# Patient Record
Sex: MALE | Race: WHITE | NOT HISPANIC OR LATINO | Employment: OTHER | URBAN - METROPOLITAN AREA
[De-identification: names, ages, dates, MRNs, and addresses within clinical notes are randomized per-mention and may not be internally consistent; named-entity substitution may affect disease eponyms.]

---

## 2018-01-10 ENCOUNTER — AMBULATORY - HEALTHEAST (OUTPATIENT)
Dept: GERIATRICS | Facility: CLINIC | Age: 73
End: 2018-01-10

## 2018-01-11 ENCOUNTER — AMBULATORY - HEALTHEAST (OUTPATIENT)
Dept: ADMINISTRATIVE | Facility: CLINIC | Age: 73
End: 2018-01-11

## 2018-01-11 RX ORDER — POLYETHYLENE GLYCOL 3350 17 G/17G
17 POWDER, FOR SOLUTION ORAL DAILY PRN
Status: SHIPPED | COMMUNITY
Start: 2018-01-08

## 2018-01-11 RX ORDER — ACETAMINOPHEN 325 MG/1
650 TABLET ORAL EVERY 4 HOURS PRN
Status: SHIPPED | COMMUNITY
Start: 2018-01-08

## 2018-01-11 RX ORDER — ATORVASTATIN CALCIUM 40 MG/1
40 TABLET, FILM COATED ORAL DAILY
Status: SHIPPED | COMMUNITY
Start: 2018-01-08

## 2018-01-11 RX ORDER — FUROSEMIDE 20 MG
20 TABLET ORAL DAILY
Status: SHIPPED | COMMUNITY
Start: 2018-01-08

## 2018-01-11 RX ORDER — ISOSORBIDE MONONITRATE 30 MG/1
30 TABLET, EXTENDED RELEASE ORAL DAILY
Status: SHIPPED | COMMUNITY
Start: 2018-01-08

## 2018-01-17 ENCOUNTER — OFFICE VISIT - HEALTHEAST (OUTPATIENT)
Dept: GERIATRICS | Facility: CLINIC | Age: 73
End: 2018-01-17

## 2018-01-17 DIAGNOSIS — N18.30 ACUTE WORSENING OF STAGE 3 CHRONIC KIDNEY DISEASE (H): ICD-10-CM

## 2018-01-17 DIAGNOSIS — F89 CHILD DEVELOPMENT DEVIATION: ICD-10-CM

## 2018-01-17 DIAGNOSIS — I10 ESSENTIAL (PRIMARY) HYPERTENSION: ICD-10-CM

## 2018-01-17 DIAGNOSIS — I50.22 CHRONIC SYSTOLIC HEART FAILURE (H): ICD-10-CM

## 2018-01-17 DIAGNOSIS — M10.9 GOUT: ICD-10-CM

## 2018-01-17 RX ORDER — PREDNISONE 20 MG/1
40 TABLET ORAL DAILY
Status: SHIPPED | COMMUNITY
Start: 2018-01-09 | End: 2023-01-01

## 2018-01-18 ENCOUNTER — OFFICE VISIT - HEALTHEAST (OUTPATIENT)
Dept: GERIATRICS | Facility: CLINIC | Age: 73
End: 2018-01-18

## 2018-01-18 DIAGNOSIS — I10 ESSENTIAL HYPERTENSION: ICD-10-CM

## 2018-01-18 DIAGNOSIS — F89 DEVELOPMENTAL DISABILITY: ICD-10-CM

## 2018-01-18 DIAGNOSIS — E78.5 DYSLIPIDEMIA: ICD-10-CM

## 2018-01-18 DIAGNOSIS — I50.9 CONGESTIVE HEART FAILURE (H): ICD-10-CM

## 2018-01-18 DIAGNOSIS — M10.9 GOUT: ICD-10-CM

## 2018-01-19 ENCOUNTER — OFFICE VISIT - HEALTHEAST (OUTPATIENT)
Dept: GERIATRICS | Facility: CLINIC | Age: 73
End: 2018-01-19

## 2018-01-19 DIAGNOSIS — I10 ESSENTIAL (PRIMARY) HYPERTENSION: ICD-10-CM

## 2018-01-19 DIAGNOSIS — I42.8 NONISCHEMIC CARDIOMYOPATHY (H): ICD-10-CM

## 2018-01-19 DIAGNOSIS — M10.9 GOUT: ICD-10-CM

## 2018-01-19 DIAGNOSIS — I50.22 CHRONIC SYSTOLIC HEART FAILURE (H): ICD-10-CM

## 2018-01-22 ENCOUNTER — AMBULATORY - HEALTHEAST (OUTPATIENT)
Dept: GERIATRICS | Facility: CLINIC | Age: 73
End: 2018-01-22

## 2021-05-29 ENCOUNTER — RECORDS - HEALTHEAST (OUTPATIENT)
Dept: ADMINISTRATIVE | Facility: CLINIC | Age: 76
End: 2021-05-29

## 2021-05-31 VITALS — BODY MASS INDEX: 23.01 KG/M2 | WEIGHT: 121.8 LBS

## 2021-05-31 VITALS — BODY MASS INDEX: 23.17 KG/M2 | WEIGHT: 122.6 LBS

## 2021-06-15 NOTE — PROGRESS NOTES
[facility]    Facility:   [facility] [[id number]]   Code Status: FULL CODE      CHIEF COMPLAINT/REASON FOR VISIT:  Chief Complaint   Patient presents with     Review Of Multiple Medical Conditions       HISTORY:      HPI: Akin is a 72 y.o. male undergoing therapy at [facility]. He presented to [facility] ER on 1/6/18 after his brother was unable to reach him on the telephone. He was found on the BR floor where he had crawled to  due to left knee pain. He was tapped in the ER and found to have an elevated CRP. A uric acid level > 9 and urate crystals found in the fluid. He is currently on a steroid taper. His HX includes developmentally disability, Sensorineural hearing loss, HTN and chronic systolic heart failure thought to be mild. He was seen in his room. Pleasant and compliant with exam. His left knee was found to have a light bruise. There was no redness. He denied left knee pain. He will discharge to home on Saturday with home care services to begin on 1/23/18 due to his guardian being out of town. He denied any CP or SOB. He was alert and oriented to self, date and situation. He appeared to be doing well and had no concerns.     Past Medical History:   Diagnosis Date     Acute gout of left knee      Cataract      Chronic systolic heart failure 6/28/2013     Depression      Developmental disability      HLD (hyperlipidemia)      Hypertension      Nonischemic cardiomyopathy      Sensorineural hearing loss, bilateral              Family History   Problem Relation Age of Onset     Heart disease Father      CAD     Hypertension Father      Stroke Father      Valvular heart disease Mother      Social History     Social History     Marital status: Single     Spouse name: N/A     Number of children: N/A     Years of education: N/A     Social History Main Topics     Smoking status: Former Smoker     Packs/day: 1.00     Years: 40.00     Quit date:  1/1/1995     Smokeless tobacco: Never Used     Alcohol use No     Drug use: No     Sexual activity: Not on file     Other Topics Concern     Not on file     Social History Narrative         Review of Systems   Constitutional: Negative for activity change, appetite change, chills, fatigue and fever.   HENT: Negative for congestion and sore throat.    Eyes: Negative for visual disturbance.   Respiratory: Negative for cough, shortness of breath and wheezing.    Cardiovascular: Negative for chest pain and leg swelling.   Gastrointestinal: Negative for abdominal distention, abdominal pain, constipation, diarrhea and nausea.   Genitourinary: Negative for dysuria.   Musculoskeletal: Positive for arthralgias and joint swelling. Negative for myalgias.        Minimal left knee   Skin: Negative for color change, rash and wound.   Neurological: Negative for dizziness, weakness and numbness.   Psychiatric/Behavioral: Negative for agitation, behavioral problems and sleep disturbance.       .  Vitals:    01/17/18 1017   BP: 110/58   Pulse: 76   Resp: 20   Temp: 97.4  F (36.3  C)   SpO2: 95%   Weight: 122 lb 9.6 oz (55.6 kg)       Physical Exam   Constitutional: He appears well-developed and well-nourished.   HENT:   Head: Normocephalic.   Eyes: Conjunctivae are normal.   Neck: Normal range of motion.   Cardiovascular: Normal rate, regular rhythm and normal heart sounds.    No murmur heard.  Pulmonary/Chest: Breath sounds normal. No respiratory distress. He has no wheezes. He has no rales.   Abdominal: Soft. Bowel sounds are normal. He exhibits no distension. There is no tenderness.   Musculoskeletal: Normal range of motion. He exhibits no edema.   Neurological: He is alert.   Oriented to self, date and situation   Skin: Skin is warm.   Psychiatric: He has a normal mood and affect. His behavior is normal.   developmentally disability         LABS:   Hospital labs   Uric acid > 9  Elevated CRP  Positive crystals in tapped left knee  fluid.       Current Outpatient Prescriptions   Medication Sig     acetaminophen (TYLENOL) 325 MG tablet Take 650 mg by mouth every 4 (four) hours as needed.     aspirin 81 mg chewable tablet Chew 81 mg daily.     atorvastatin (LIPITOR) 40 MG tablet Take 40 mg by mouth daily.     carvedilol (COREG) 6.25 MG tablet Take 6.25 mg by mouth 2 (two) times a day with meals.     furosemide (LASIX) 20 MG tablet Take 20 mg by mouth daily as needed. Only if weight has increased by > 3 lbs in 2 days.     isosorbide mononitrate (IMDUR) 30 MG 24 hr tablet Take 30 mg by mouth daily.     oxyCODONE-acetaminophen (PERCOCET) 5-325 mg per tablet Take 1 tablet by mouth every 6 (six) hours as needed.     polyethylene glycol (MIRALAX) 17 gram packet Take 17 g by mouth daily as needed.     predniSONE (DELTASONE) 20 MG tablet Take 40 mg by mouth daily. Prednisone 40 mg daily for 5 days and then 20 mg daily for 5 days for gout     ASSESSMENT:      ICD-10-CM    1. Gout M10.9     left knee     2. Chronic systolic heart failure I50.22    3. Child development deviation F89    4. Acute on chronic kidney disease, stage 3 N18.3    5. Essential (primary) hypertension I10        PLAN:    Left knee gout- continue steroid taper as ordered. 40mg daily x 5 days then 20 mg daily x 5 days.  Hypertension stable on coreg lasix  Heart failure- on coreg, imdur and lasix  Child development deviation- provide a safe and comfortable environment   Pain management - denies percocet PRN    Electronically signed by: Mone Odom CNP

## 2021-06-15 NOTE — PROGRESS NOTES
[facility]      Facility:    [facility] [[id number]]  Code Status: FULL CODE       Chief Complaint/Reason for Visit:  Chief Complaint   Patient presents with     H & P     New admit to TCU-gout left knee. (H & P 1/18/18).       HPI:   Akin is a 72 y.o. male with hx of CHF, developmental disability presented to the hospital on 1/6/18 due to left knee pain for about a week. He reported knee swelling and inability to walk due to the pain and swelling. He lives alone and his brother went to check on him on the day of admit, found him on the bathroom floor as he had crawled there to use the bathroom due to the pain. His left knee was tapped in the ED with high level of uric acid crystals c/w gout. He was started on prednisone with improvement in symptoms. He was seen by ortho who recommended against steroid injection at this time but he should have later follow up. Overall stabilized and discharged to TCU on 1/9/18 for PT, OT, nursing cares, medical management and monitoring.     Today:  He has been doing well. Working with therapy. Ambulating without assist. He denies any pain. No fever. Appetite is good. No shortness of breath or chest pain. No dizziness. No new vision or hearing problems. Note that he lives alone, help from brother who is guardian.       Past Medical History:  Past Medical History:   Diagnosis Date     Acute gout of left knee      Cataract      Chronic systolic heart failure 6/28/2013     Depression      Developmental disability      HLD (hyperlipidemia)      Hypertension      Nonischemic cardiomyopathy      Sensorineural hearing loss, bilateral            Surgical History:  Past Surgical History:   Procedure Laterality Date     CATARACT EXTRACTION Bilateral      ESOPHAGOGASTRODUODENOSCOPY  11/2005       Family History:   Family History   Problem Relation Age of Onset     Heart disease Father      CAD     Hypertension Father      Stroke Father       Valvular heart disease Mother        Social History:    Social History     Social History     Marital status: Single     Spouse name: N/A     Number of children: N/A     Years of education: N/A     Social History Main Topics     Smoking status: Former Smoker     Packs/day: 1.00     Years: 40.00     Quit date: 1/1/1995     Smokeless tobacco: Never Used     Alcohol use No     Drug use: No     Sexual activity: Not on file     Other Topics Concern     Not on file     Social History Narrative       Medications:  Current Outpatient Prescriptions   Medication Sig     acetaminophen (TYLENOL) 325 MG tablet Take 650 mg by mouth every 4 (four) hours as needed.     aspirin 81 mg chewable tablet Chew 81 mg daily.     atorvastatin (LIPITOR) 40 MG tablet Take 40 mg by mouth daily.     carvedilol (COREG) 6.25 MG tablet Take 3.125 mg by mouth 2 (two) times a day with meals.      furosemide (LASIX) 20 MG tablet Take 20 mg by mouth daily as needed. Only if weight has increased by > 3 lbs in 2 days.     isosorbide mononitrate (IMDUR) 30 MG 24 hr tablet Take 30 mg by mouth daily.     polyethylene glycol (MIRALAX) 17 gram packet Take 17 g by mouth daily as needed.     predniSONE (DELTASONE) 20 MG tablet Take 40 mg by mouth daily. Prednisone 40 mg daily for 5 days and then 20 mg daily for 5 days for gout        Allergies:  No Known Allergies      Review of Systems:  Pertinent items are noted in HPI.      Physical Exam:   General: Patient is alert, pleasant male, no distress.   Vitals: /80, Temp 98, Pulse 76, RR 18, O2 sat 98% RA.  HEENT: Head is NCAT. Eyes show no injection or icterus. Nares negative. Oropharynx well hydrated.  Neck: Supple. No tenderness or adenopathy. No JVD.  Lungs: Clear bilaterally. No wheezes.  Cardiovascular: Regular rate and rhythm, normal S1. S2.  Back: No spinal or CVA tenderness.  Abdomen: Soft, no tenderness on exam. Bowel sounds present. No guarding rebound or rigidity.  : Deferred.  Extremities: No  LE edema is noted.  Musculoskeletal: No swelling or redness of left knee.   Skin: No rashes.   Psych: Mood appears good.      Labs:  CBC (01/06/2018 8:42 PM)  CBC (01/06/2018 8:42 PM)   Component Value Ref Range   WHITE BLOOD COUNT  12.6 (H) 4.5 - 11.0 thou/cu mm   RED BLOOD COUNT  4.13 (L) 4.30 - 5.90 mil/cu mm   HEMOGLOBIN  12.2 (L) 13.5 - 17.5 g/dL   HEMATOCRIT  38.4 37.0 - 53.0 %   MCV  93 80 - 100 fL   MCH  29.5 26.0 - 34.0 pg   MCHC  31.8 (L) 32.0 - 36.0 g/dL   RDW  14.4 11.5 - 15.5 %   PLATELET COUNT  313 140 - 440 thou/cu mm   MPV  9.8 6.5 - 11.0 fL     BASIC METABOLIC PANEL (01/06/2018 8:42 PM)  BASIC METABOLIC PANEL (01/06/2018 8:42 PM)   Component Value Ref Range   SODIUM 137 135 - 145 mmol/L   POTASSIUM 4.6 3.5 - 5.0 mmol/L   CHLORIDE 102 98 - 110 mmol/L   CO2,TOTAL 19 (L) 21 - 31 mmol/L   ANION GAP 16 5 - 18    GLUCOSE 111 (H) 65 - 100 mg/dL   CALCIUM 10.1 8.5 - 10.5 mg/dL   BUN 44 (H) 8 - 25 mg/dL   CREATININE 1.90 (H) 0.72 - 1.25 mg/dL   BUN/CREAT RATIO  23 (H) 10 - 20    GFR if African American 42 (L) >60 ml/min/1.73m2   GFR if not African American 35 (L) >60 ml/min/1.73m2     Basic Metabolic Panel (01/07/2018 6:57 AM)  Basic Metabolic Panel (01/07/2018 6:57 AM)   Component Value Ref Range   SODIUM 141 135 - 145 mmol/L   POTASSIUM 4.6 3.5 - 5.0 mmol/L   CHLORIDE 106 98 - 110 mmol/L   CO2,TOTAL 21 21 - 31 mmol/L   ANION GAP 14 5 - 18    GLUCOSE 134 (H) 65 - 100 mg/dL   CALCIUM 9.7 8.5 - 10.5 mg/dL   BUN 49 (H) 8 - 25 mg/dL   CREATININE 1.87 (H) 0.72 - 1.25 mg/dL   BUN/CREAT RATIO  26 (H) 10 - 20    GFR if African American 43 (L) >60 ml/min/1.73m2   GFR if not  36 (L) >60 ml/min/1.73m2     Uric Acid (01/06/2018 8:42 PM)  Uric Acid (01/06/2018 8:42 PM)   Component Value Ref Range   URIC ACID 9.1 (H) 3.5 - 7.2 mg/dL     SEDIMENTATION RATE (01/06/2018 8:42 PM)  SEDIMENTATION RATE (01/06/2018 8:42 PM)   Component Value Ref Range   SEDIMENTATION RATE  104 (H) <20 mm/hr     C-Reactive Protein  (CRP) (01/06/2018 8:42 PM)  C-Reactive Protein (CRP) (01/06/2018 8:42 PM)   Component Value Ref Range   C-REACTIVE PROTEIN  28.50 (H) <0.50 mg/dL       Assessment/Plan:  1. Gout, left knee. Responded to prednisone. Follow up with ortho.   2. Hx CHF. No exacerbation. Cont medical management.  3. HTN. On Coreg. Monitor BPs.  4. Dyslipidemia. On Atorvastatin.  5. Developmental disability.  6. CKD. Labs as noted above.  7. Hearing loss.  8. Code status is full code.      Total time greater than 50 minutes, greater than 50% counseling and coordination of care, time spent in interview and examination of patient, review of records, discussion with nursing staff.      Electronically signed by: Suzanne Vargas MD

## 2021-06-15 NOTE — PROGRESS NOTES
[facility]    Facility:   [facility] [[id number]]   Code Status: FULL CODE  PCP: Santos Garrett MD   Phone: [phone]   Fax: [phone]      CHIEF COMPLAINT/REASON FOR VISIT:  Chief Complaint   Patient presents with     Discharge Summary       HISTORY COURSE:  Akin is a 72 y.o. male undergoing therapy at [facility]. He presented to [facility] ER on 1/6/18 after his brother was unable to reach him on the telephone. He was found on the BR floor where he had crawled to  due to left knee pain. He was tapped in the ER and found to have an elevated CRP. A uric acid level > 9 and urate crystals found in the fluid. He is currently on a steroid taper. His HX includes developmentally disability, Sensorineural hearing loss, HTN and chronic systolic heart failure thought to be mild. He was seen in his room. Pleasant and compliant with exam. His left knee was found to have a light bruise. There was no redness. He denied left knee pain. He will discharge to home on Saturday with home care services to begin on 1/23/18 due to his guardian being out of town. He denied any CP or SOB. He was alert and oriented to self, date and situation. He appeared to be doing well and had no concerns. However he was found to be bradycardic with an apical pulse of 50. AN EKG was ordered. He was asymptomatic.  His carvedilol was decreased to 3.125 BID and he is to follow up with cardiology.     Review of Systems  Constitutional: Negative for activity change, appetite change, chills, fatigue and fever.   HENT: Negative for congestion and sore throat.    Eyes: Negative for visual disturbance.   Respiratory: Negative for cough, shortness of breath and wheezing.    Cardiovascular: Negative for chest pain and leg swelling.   Gastrointestinal: Negative for abdominal distention, abdominal pain, constipation, diarrhea and nausea.   Genitourinary: Negative for dysuria.    Musculoskeletal: Positive for arthralgias and joint swelling. Negative for myalgias.        Minimal left knee   Skin: Negative for color change, rash and wound.   Neurological: Negative for dizziness, weakness and numbness.   Psychiatric/Behavioral: Negative for agitation, behavioral problems and sleep disturbance.   Vitals:    01/19/18 1006   BP: 112/64   Pulse: 76   Resp: 20   Temp: 98  F (36.7  C)   Weight: 121 lb 12.8 oz (55.2 kg)       Physical Exam   Cardiovascular:   Irregular, bradycardia     Constitutional: He appears well-developed and well-nourished.   HENT:   Head: Normocephalic.   Eyes: Conjunctivae are normal.   Neck: Normal range of motion.   Cardiovascular: Normal rate, regular rhythm and normal heart sounds.    No murmur heard.  Pulmonary/Chest: Breath sounds normal. No respiratory distress. He has no wheezes. He has no rales.   Abdominal: Soft. Bowel sounds are normal. He exhibits no distension. There is no tenderness.   Musculoskeletal: Normal range of motion. He exhibits no edema.   Neurological: He is alert.   Oriented to self, date and situation   Skin: Skin is warm.   Psychiatric: He has a normal mood and affect. His behavior is normal.   developmentally disability     MEDICATION LIST:  Current Outpatient Prescriptions   Medication Sig     acetaminophen (TYLENOL) 325 MG tablet Take 650 mg by mouth every 4 (four) hours as needed.     aspirin 81 mg chewable tablet Chew 81 mg daily.     atorvastatin (LIPITOR) 40 MG tablet Take 40 mg by mouth daily.     carvedilol (COREG) 6.25 MG tablet Take 3.125 mg by mouth 2 (two) times a day with meals.      furosemide (LASIX) 20 MG tablet Take 20 mg by mouth daily as needed. Only if weight has increased by > 3 lbs in 2 days.     isosorbide mononitrate (IMDUR) 30 MG 24 hr tablet Take 30 mg by mouth daily.     polyethylene glycol (MIRALAX) 17 gram packet Take 17 g by mouth daily as needed.     predniSONE (DELTASONE) 20 MG tablet Take 40 mg by mouth daily.  Prednisone 40 mg daily for 5 days and then 20 mg daily for 5 days for gout       DISCHARGE DIAGNOSIS:    ICD-10-CM    1. Nonischemic cardiomyopathy I42.8    2. Gout M10.9    3. Essential (primary) hypertension I10    4. Chronic systolic heart failure I50.22        MEDICAL EQUIPMENT NEEDS:  walker    DISCHARGE PLAN/FACE TO FACE:  I certify that services are/were furnished while this patient was under the care of a physician and that a physician or an allowed non-physician practitioner (NPP), had a face-to-face encounter that meets the physician face-to-face encounter requirements. The encounter was in whole, or in part, related to the primary reason for home health. The patient is confined to his/her home and needs intermittent skilled nursing, physical therapy, speech-language pathology, or the continued need for occupational therapy. A plan of care has been established by a physician and is periodically reviewed by a physician.  Date of Face-to-Face Encounter: 1/19/18    I certify that, based on my findings, the following services are medically necessary home health services: PT/OT/HHA and Nursing  My clinical findings support the need for the above skilled services because: PT/OT for continued strength following a recent gout attack  left knee, HHA to assist with ADls and Nursing to assist with medication management    This patient is homebound because: needs assistance to appointments due to developmental disability and weakness due to left knee gout attack.   The patient is, or has been, under my care and I have initiated the establishment of the plan of care. This patient will be followed by a physician who will periodically review the plan of care.      Electronically signed by: Mone Odom CNP     Electronically signed by: Suzanne Vargas MD

## 2023-01-01 ENCOUNTER — APPOINTMENT (OUTPATIENT)
Dept: OCCUPATIONAL THERAPY | Facility: CLINIC | Age: 78
DRG: 180 | End: 2023-01-01
Attending: INTERNAL MEDICINE
Payer: COMMERCIAL

## 2023-01-01 ENCOUNTER — APPOINTMENT (OUTPATIENT)
Dept: CT IMAGING | Facility: CLINIC | Age: 78
DRG: 180 | End: 2023-01-01
Attending: INTERNAL MEDICINE
Payer: COMMERCIAL

## 2023-01-01 ENCOUNTER — APPOINTMENT (OUTPATIENT)
Dept: PHYSICAL THERAPY | Facility: CLINIC | Age: 78
DRG: 180 | End: 2023-01-01
Attending: INTERNAL MEDICINE
Payer: COMMERCIAL

## 2023-01-01 ENCOUNTER — APPOINTMENT (OUTPATIENT)
Dept: MRI IMAGING | Facility: CLINIC | Age: 78
DRG: 180 | End: 2023-01-01
Attending: INTERNAL MEDICINE
Payer: COMMERCIAL

## 2023-01-01 ENCOUNTER — APPOINTMENT (OUTPATIENT)
Dept: RADIOLOGY | Facility: CLINIC | Age: 78
DRG: 180 | End: 2023-01-01
Attending: INTERNAL MEDICINE
Payer: COMMERCIAL

## 2023-01-01 ENCOUNTER — APPOINTMENT (OUTPATIENT)
Dept: CT IMAGING | Facility: CLINIC | Age: 78
DRG: 180 | End: 2023-01-01
Attending: STUDENT IN AN ORGANIZED HEALTH CARE EDUCATION/TRAINING PROGRAM
Payer: COMMERCIAL

## 2023-01-01 ENCOUNTER — DOCUMENTATION ONLY (OUTPATIENT)
Dept: CARE COORDINATION | Facility: CLINIC | Age: 78
End: 2023-01-01
Payer: COMMERCIAL

## 2023-01-01 ENCOUNTER — HOSPITAL ENCOUNTER (INPATIENT)
Facility: CLINIC | Age: 78
LOS: 5 days | DRG: 180 | End: 2023-10-09
Attending: STUDENT IN AN ORGANIZED HEALTH CARE EDUCATION/TRAINING PROGRAM | Admitting: INTERNAL MEDICINE
Payer: COMMERCIAL

## 2023-01-01 VITALS
OXYGEN SATURATION: 97 % | DIASTOLIC BLOOD PRESSURE: 71 MMHG | BODY MASS INDEX: 20.52 KG/M2 | RESPIRATION RATE: 18 BRPM | SYSTOLIC BLOOD PRESSURE: 141 MMHG | WEIGHT: 108.69 LBS | TEMPERATURE: 97.7 F | HEART RATE: 89 BPM | HEIGHT: 61 IN

## 2023-01-01 DIAGNOSIS — R09.02 HYPOXIA: ICD-10-CM

## 2023-01-01 DIAGNOSIS — C34.90 MALIGNANT NEOPLASM OF BRONCHUS AND LUNG (H): ICD-10-CM

## 2023-01-01 DIAGNOSIS — E83.52 HYPERCALCEMIA: ICD-10-CM

## 2023-01-01 LAB
ABO/RH(D): NORMAL
ALBUMIN SERPL BCG-MCNC: 2.9 G/DL (ref 3.5–5.2)
ALBUMIN SERPL BCG-MCNC: 3.3 G/DL (ref 3.5–5.2)
ALP SERPL-CCNC: 420 U/L (ref 40–129)
ALP SERPL-CCNC: 441 U/L (ref 40–129)
ALT SERPL W P-5'-P-CCNC: 46 U/L (ref 0–70)
ALT SERPL W P-5'-P-CCNC: 54 U/L (ref 0–70)
ANION GAP SERPL CALCULATED.3IONS-SCNC: 12 MMOL/L (ref 7–15)
ANION GAP SERPL CALCULATED.3IONS-SCNC: 12 MMOL/L (ref 7–15)
ANION GAP SERPL CALCULATED.3IONS-SCNC: 13 MMOL/L (ref 7–15)
ANION GAP SERPL CALCULATED.3IONS-SCNC: 8 MMOL/L (ref 7–15)
ANION GAP SERPL CALCULATED.3IONS-SCNC: 9 MMOL/L (ref 7–15)
ANTIBODY SCREEN: NEGATIVE
AST SERPL W P-5'-P-CCNC: 103 U/L (ref 0–45)
AST SERPL W P-5'-P-CCNC: 78 U/L (ref 0–45)
BASE EXCESS BLDV CALC-SCNC: 6.6 MMOL/L
BASO+EOS+MONOS # BLD AUTO: ABNORMAL 10*3/UL
BASO+EOS+MONOS # BLD AUTO: ABNORMAL 10*3/UL
BASO+EOS+MONOS NFR BLD AUTO: ABNORMAL %
BASO+EOS+MONOS NFR BLD AUTO: ABNORMAL %
BASOPHILS # BLD AUTO: 0 10E3/UL (ref 0–0.2)
BASOPHILS # BLD AUTO: 0.1 10E3/UL (ref 0–0.2)
BASOPHILS NFR BLD AUTO: 0 %
BASOPHILS NFR BLD AUTO: 1 %
BILIRUB DIRECT SERPL-MCNC: <0.2 MG/DL (ref 0–0.3)
BILIRUB SERPL-MCNC: 0.4 MG/DL
BILIRUB SERPL-MCNC: 0.5 MG/DL
BUN SERPL-MCNC: 49.6 MG/DL (ref 8–23)
BUN SERPL-MCNC: 52.8 MG/DL (ref 8–23)
BUN SERPL-MCNC: 57.5 MG/DL (ref 8–23)
BUN SERPL-MCNC: 59.1 MG/DL (ref 8–23)
BUN SERPL-MCNC: 74.3 MG/DL (ref 8–23)
CALCIUM SERPL-MCNC: 11.4 MG/DL (ref 8.8–10.2)
CALCIUM SERPL-MCNC: 11.6 MG/DL (ref 8.8–10.2)
CALCIUM SERPL-MCNC: 11.7 MG/DL (ref 8.8–10.2)
CALCIUM SERPL-MCNC: 12.2 MG/DL (ref 8.8–10.2)
CALCIUM SERPL-MCNC: 12.5 MG/DL (ref 8.8–10.2)
CHLORIDE SERPL-SCNC: 104 MMOL/L (ref 98–107)
CHLORIDE SERPL-SCNC: 107 MMOL/L (ref 98–107)
CHLORIDE SERPL-SCNC: 109 MMOL/L (ref 98–107)
CHLORIDE SERPL-SCNC: 109 MMOL/L (ref 98–107)
CHLORIDE SERPL-SCNC: 110 MMOL/L (ref 98–107)
CREAT SERPL-MCNC: 1.52 MG/DL (ref 0.67–1.17)
CREAT SERPL-MCNC: 1.55 MG/DL (ref 0.67–1.17)
CREAT SERPL-MCNC: 1.57 MG/DL (ref 0.67–1.17)
CREAT SERPL-MCNC: 1.79 MG/DL (ref 0.67–1.17)
CREAT SERPL-MCNC: 2.64 MG/DL (ref 0.67–1.17)
DEPRECATED HCO3 PLAS-SCNC: 22 MMOL/L (ref 22–29)
DEPRECATED HCO3 PLAS-SCNC: 23 MMOL/L (ref 22–29)
DEPRECATED HCO3 PLAS-SCNC: 24 MMOL/L (ref 22–29)
DEPRECATED HCO3 PLAS-SCNC: 27 MMOL/L (ref 22–29)
DEPRECATED HCO3 PLAS-SCNC: 29 MMOL/L (ref 22–29)
EGFRCR SERPLBLD CKD-EPI 2021: 24 ML/MIN/1.73M2
EGFRCR SERPLBLD CKD-EPI 2021: 39 ML/MIN/1.73M2
EGFRCR SERPLBLD CKD-EPI 2021: 45 ML/MIN/1.73M2
EGFRCR SERPLBLD CKD-EPI 2021: 46 ML/MIN/1.73M2
EGFRCR SERPLBLD CKD-EPI 2021: 47 ML/MIN/1.73M2
EOSINOPHIL # BLD AUTO: 0 10E3/UL (ref 0–0.7)
EOSINOPHIL # BLD AUTO: 0.1 10E3/UL (ref 0–0.7)
EOSINOPHIL NFR BLD AUTO: 0 %
EOSINOPHIL NFR BLD AUTO: 1 %
ERYTHROCYTE [DISTWIDTH] IN BLOOD BY AUTOMATED COUNT: 17.3 % (ref 10–15)
ERYTHROCYTE [DISTWIDTH] IN BLOOD BY AUTOMATED COUNT: 17.6 % (ref 10–15)
ERYTHROCYTE [DISTWIDTH] IN BLOOD BY AUTOMATED COUNT: 17.7 % (ref 10–15)
ERYTHROCYTE [DISTWIDTH] IN BLOOD BY AUTOMATED COUNT: 17.8 % (ref 10–15)
ERYTHROCYTE [DISTWIDTH] IN BLOOD BY AUTOMATED COUNT: 17.9 % (ref 10–15)
GLUCOSE SERPL-MCNC: 101 MG/DL (ref 70–99)
GLUCOSE SERPL-MCNC: 113 MG/DL (ref 70–99)
GLUCOSE SERPL-MCNC: 114 MG/DL (ref 70–99)
GLUCOSE SERPL-MCNC: 147 MG/DL (ref 70–99)
GLUCOSE SERPL-MCNC: 162 MG/DL (ref 70–99)
HCO3 BLDV-SCNC: 32 MMOL/L (ref 24–30)
HCT VFR BLD AUTO: 36 % (ref 40–53)
HCT VFR BLD AUTO: 36.3 % (ref 40–53)
HCT VFR BLD AUTO: 38 % (ref 40–53)
HCT VFR BLD AUTO: 38.1 % (ref 40–53)
HCT VFR BLD AUTO: 39.5 % (ref 40–53)
HGB BLD-MCNC: 11.4 G/DL (ref 13.3–17.7)
HGB BLD-MCNC: 11.5 G/DL (ref 13.3–17.7)
HGB BLD-MCNC: 12.1 G/DL (ref 13.3–17.7)
HGB BLD-MCNC: 12.1 G/DL (ref 13.3–17.7)
HGB BLD-MCNC: 12.7 G/DL (ref 13.3–17.7)
IMM GRANULOCYTES # BLD: 0 10E3/UL
IMM GRANULOCYTES # BLD: 0.1 10E3/UL
IMM GRANULOCYTES NFR BLD: 0 %
IMM GRANULOCYTES NFR BLD: 1 %
INR PPP: 1.08 (ref 0.85–1.15)
LIPASE SERPL-CCNC: 50 U/L (ref 13–60)
LYMPHOCYTES # BLD AUTO: 1.8 10E3/UL (ref 0.8–5.3)
LYMPHOCYTES # BLD AUTO: 3.7 10E3/UL (ref 0.8–5.3)
LYMPHOCYTES NFR BLD AUTO: 13 %
LYMPHOCYTES NFR BLD AUTO: 34 %
MAGNESIUM SERPL-MCNC: 1.9 MG/DL (ref 1.7–2.3)
MAGNESIUM SERPL-MCNC: 2 MG/DL (ref 1.7–2.3)
MAGNESIUM SERPL-MCNC: 2.2 MG/DL (ref 1.7–2.3)
MAGNESIUM SERPL-MCNC: 2.3 MG/DL (ref 1.7–2.3)
MCH RBC QN AUTO: 33.9 PG (ref 26.5–33)
MCH RBC QN AUTO: 34.1 PG (ref 26.5–33)
MCH RBC QN AUTO: 34.4 PG (ref 26.5–33)
MCH RBC QN AUTO: 34.4 PG (ref 26.5–33)
MCH RBC QN AUTO: 34.5 PG (ref 26.5–33)
MCHC RBC AUTO-ENTMCNC: 31.7 G/DL (ref 31.5–36.5)
MCHC RBC AUTO-ENTMCNC: 31.7 G/DL (ref 31.5–36.5)
MCHC RBC AUTO-ENTMCNC: 31.8 G/DL (ref 31.5–36.5)
MCHC RBC AUTO-ENTMCNC: 31.8 G/DL (ref 31.5–36.5)
MCHC RBC AUTO-ENTMCNC: 32.2 G/DL (ref 31.5–36.5)
MCV RBC AUTO: 107 FL (ref 78–100)
MCV RBC AUTO: 108 FL (ref 78–100)
MCV RBC AUTO: 109 FL (ref 78–100)
MONOCYTES # BLD AUTO: 0.8 10E3/UL (ref 0–1.3)
MONOCYTES # BLD AUTO: 0.9 10E3/UL (ref 0–1.3)
MONOCYTES NFR BLD AUTO: 6 %
MONOCYTES NFR BLD AUTO: 8 %
NEUTROPHILS # BLD AUTO: 10.7 10E3/UL (ref 1.6–8.3)
NEUTROPHILS # BLD AUTO: 6.3 10E3/UL (ref 1.6–8.3)
NEUTROPHILS NFR BLD AUTO: 56 %
NEUTROPHILS NFR BLD AUTO: 80 %
NRBC # BLD AUTO: 0 10E3/UL
NRBC # BLD AUTO: 0 10E3/UL
NRBC BLD AUTO-RTO: 0 /100
NRBC BLD AUTO-RTO: 0 /100
NT-PROBNP SERPL-MCNC: 3394 PG/ML (ref 0–1800)
NT-PROBNP SERPL-MCNC: ABNORMAL PG/ML (ref 0–1800)
OXYHGB MFR BLDV: 79.2 % (ref 70–75)
PCO2 BLDV: 54 MM HG (ref 35–50)
PH BLDV: 7.38 [PH] (ref 7.35–7.45)
PLATELET # BLD AUTO: 118 10E3/UL (ref 150–450)
PLATELET # BLD AUTO: 129 10E3/UL (ref 150–450)
PLATELET # BLD AUTO: 131 10E3/UL (ref 150–450)
PLATELET # BLD AUTO: 135 10E3/UL (ref 150–450)
PLATELET # BLD AUTO: 141 10E3/UL (ref 150–450)
PO2 BLDV: 48 MM HG (ref 25–47)
POTASSIUM SERPL-SCNC: 3.7 MMOL/L (ref 3.4–5.3)
POTASSIUM SERPL-SCNC: 3.9 MMOL/L (ref 3.4–5.3)
POTASSIUM SERPL-SCNC: 4.1 MMOL/L (ref 3.4–5.3)
POTASSIUM SERPL-SCNC: 4.3 MMOL/L (ref 3.4–5.3)
POTASSIUM SERPL-SCNC: 4.3 MMOL/L (ref 3.4–5.3)
POTASSIUM SERPL-SCNC: 5.2 MMOL/L (ref 3.4–5.3)
PROT SERPL-MCNC: 6.3 G/DL (ref 6.4–8.3)
PROT SERPL-MCNC: 7 G/DL (ref 6.4–8.3)
PTH-INTACT SERPL-MCNC: 9 PG/ML (ref 15–65)
RBC # BLD AUTO: 3.3 10E6/UL (ref 4.4–5.9)
RBC # BLD AUTO: 3.39 10E6/UL (ref 4.4–5.9)
RBC # BLD AUTO: 3.52 10E6/UL (ref 4.4–5.9)
RBC # BLD AUTO: 3.55 10E6/UL (ref 4.4–5.9)
RBC # BLD AUTO: 3.69 10E6/UL (ref 4.4–5.9)
SAO2 % BLDV: 80.7 % (ref 70–75)
SODIUM SERPL-SCNC: 139 MMOL/L (ref 135–145)
SODIUM SERPL-SCNC: 141 MMOL/L (ref 135–145)
SODIUM SERPL-SCNC: 144 MMOL/L (ref 135–145)
SODIUM SERPL-SCNC: 146 MMOL/L (ref 135–145)
SODIUM SERPL-SCNC: 148 MMOL/L (ref 135–145)
SPECIMEN EXPIRATION DATE: NORMAL
WBC # BLD AUTO: 10.6 10E3/UL (ref 4–11)
WBC # BLD AUTO: 11 10E3/UL (ref 4–11)
WBC # BLD AUTO: 13.5 10E3/UL (ref 4–11)
WBC # BLD AUTO: 15.2 10E3/UL (ref 4–11)
WBC # BLD AUTO: 8.9 10E3/UL (ref 4–11)

## 2023-01-01 PROCEDURE — 71045 X-RAY EXAM CHEST 1 VIEW: CPT

## 2023-01-01 PROCEDURE — 99223 1ST HOSP IP/OBS HIGH 75: CPT | Performed by: INTERNAL MEDICINE

## 2023-01-01 PROCEDURE — 250N000011 HC RX IP 250 OP 636: Mod: JZ | Performed by: INTERNAL MEDICINE

## 2023-01-01 PROCEDURE — 258N000003 HC RX IP 258 OP 636: Performed by: INTERNAL MEDICINE

## 2023-01-01 PROCEDURE — 71250 CT THORAX DX C-: CPT

## 2023-01-01 PROCEDURE — 83735 ASSAY OF MAGNESIUM: CPT | Performed by: INTERNAL MEDICINE

## 2023-01-01 PROCEDURE — 99233 SBSQ HOSP IP/OBS HIGH 50: CPT | Performed by: INTERNAL MEDICINE

## 2023-01-01 PROCEDURE — 250N000013 HC RX MED GY IP 250 OP 250 PS 637: Performed by: HOSPITALIST

## 2023-01-01 PROCEDURE — 120N000001 HC R&B MED SURG/OB

## 2023-01-01 PROCEDURE — 250N000009 HC RX 250: Performed by: HOSPITALIST

## 2023-01-01 PROCEDURE — 99232 SBSQ HOSP IP/OBS MODERATE 35: CPT | Performed by: INTERNAL MEDICINE

## 2023-01-01 PROCEDURE — 97161 PT EVAL LOW COMPLEX 20 MIN: CPT | Mod: GP

## 2023-01-01 PROCEDURE — 70551 MRI BRAIN STEM W/O DYE: CPT

## 2023-01-01 PROCEDURE — 99285 EMERGENCY DEPT VISIT HI MDM: CPT | Mod: 25

## 2023-01-01 PROCEDURE — 83880 ASSAY OF NATRIURETIC PEPTIDE: CPT | Performed by: INTERNAL MEDICINE

## 2023-01-01 PROCEDURE — 250N000011 HC RX IP 250 OP 636: Performed by: INTERNAL MEDICINE

## 2023-01-01 PROCEDURE — 85027 COMPLETE CBC AUTOMATED: CPT | Performed by: INTERNAL MEDICINE

## 2023-01-01 PROCEDURE — 86900 BLOOD TYPING SEROLOGIC ABO: CPT | Performed by: INTERNAL MEDICINE

## 2023-01-01 PROCEDURE — 258N000003 HC RX IP 258 OP 636: Performed by: STUDENT IN AN ORGANIZED HEALTH CARE EDUCATION/TRAINING PROGRAM

## 2023-01-01 PROCEDURE — 97116 GAIT TRAINING THERAPY: CPT | Mod: GP

## 2023-01-01 PROCEDURE — 80048 BASIC METABOLIC PNL TOTAL CA: CPT | Performed by: INTERNAL MEDICINE

## 2023-01-01 PROCEDURE — 99418 PROLNG IP/OBS E/M EA 15 MIN: CPT | Performed by: NURSE PRACTITIONER

## 2023-01-01 PROCEDURE — 36415 COLL VENOUS BLD VENIPUNCTURE: CPT | Performed by: INTERNAL MEDICINE

## 2023-01-01 PROCEDURE — 85610 PROTHROMBIN TIME: CPT | Performed by: INTERNAL MEDICINE

## 2023-01-01 PROCEDURE — 80053 COMPREHEN METABOLIC PANEL: CPT | Performed by: INTERNAL MEDICINE

## 2023-01-01 PROCEDURE — C9113 INJ PANTOPRAZOLE SODIUM, VIA: HCPCS | Mod: JZ | Performed by: INTERNAL MEDICINE

## 2023-01-01 PROCEDURE — 99239 HOSP IP/OBS DSCHRG MGMT >30: CPT | Performed by: INTERNAL MEDICINE

## 2023-01-01 PROCEDURE — 97165 OT EVAL LOW COMPLEX 30 MIN: CPT | Mod: GO

## 2023-01-01 PROCEDURE — 74176 CT ABD & PELVIS W/O CONTRAST: CPT

## 2023-01-01 PROCEDURE — 250N000013 HC RX MED GY IP 250 OP 250 PS 637: Performed by: INTERNAL MEDICINE

## 2023-01-01 PROCEDURE — 84132 ASSAY OF SERUM POTASSIUM: CPT | Performed by: INTERNAL MEDICINE

## 2023-01-01 PROCEDURE — 96360 HYDRATION IV INFUSION INIT: CPT

## 2023-01-01 PROCEDURE — 82805 BLOOD GASES W/O2 SATURATION: CPT | Performed by: INTERNAL MEDICINE

## 2023-01-01 PROCEDURE — 85025 COMPLETE CBC W/AUTO DIFF WBC: CPT | Performed by: STUDENT IN AN ORGANIZED HEALTH CARE EDUCATION/TRAINING PROGRAM

## 2023-01-01 PROCEDURE — 80053 COMPREHEN METABOLIC PANEL: CPT | Performed by: STUDENT IN AN ORGANIZED HEALTH CARE EDUCATION/TRAINING PROGRAM

## 2023-01-01 PROCEDURE — 82542 COL CHROMOTOGRAPHY QUAL/QUAN: CPT | Performed by: INTERNAL MEDICINE

## 2023-01-01 PROCEDURE — 72128 CT CHEST SPINE W/O DYE: CPT

## 2023-01-01 PROCEDURE — 83970 ASSAY OF PARATHORMONE: CPT | Performed by: INTERNAL MEDICINE

## 2023-01-01 PROCEDURE — 93005 ELECTROCARDIOGRAM TRACING: CPT | Performed by: STUDENT IN AN ORGANIZED HEALTH CARE EDUCATION/TRAINING PROGRAM

## 2023-01-01 PROCEDURE — 99223 1ST HOSP IP/OBS HIGH 75: CPT | Performed by: NURSE PRACTITIONER

## 2023-01-01 PROCEDURE — 83690 ASSAY OF LIPASE: CPT | Performed by: STUDENT IN AN ORGANIZED HEALTH CARE EDUCATION/TRAINING PROGRAM

## 2023-01-01 PROCEDURE — 85025 COMPLETE CBC W/AUTO DIFF WBC: CPT | Performed by: INTERNAL MEDICINE

## 2023-01-01 PROCEDURE — 99418 PROLNG IP/OBS E/M EA 15 MIN: CPT | Performed by: INTERNAL MEDICINE

## 2023-01-01 PROCEDURE — 96361 HYDRATE IV INFUSION ADD-ON: CPT

## 2023-01-01 PROCEDURE — 36415 COLL VENOUS BLD VENIPUNCTURE: CPT | Performed by: STUDENT IN AN ORGANIZED HEALTH CARE EDUCATION/TRAINING PROGRAM

## 2023-01-01 RX ORDER — FUROSEMIDE 10 MG/ML
40 INJECTION INTRAMUSCULAR; INTRAVENOUS ONCE
Status: COMPLETED | OUTPATIENT
Start: 2023-01-01 | End: 2023-01-01

## 2023-01-01 RX ORDER — MORPHINE SULFATE 20 MG/ML
10 SOLUTION ORAL
Status: DISCONTINUED | OUTPATIENT
Start: 2023-01-01 | End: 2023-01-01

## 2023-01-01 RX ORDER — ACETAMINOPHEN 650 MG/1
650 SUPPOSITORY RECTAL EVERY 6 HOURS PRN
Status: DISCONTINUED | OUTPATIENT
Start: 2023-01-01 | End: 2023-01-01 | Stop reason: HOSPADM

## 2023-01-01 RX ORDER — HYDROMORPHONE HYDROCHLORIDE 1 MG/ML
0.5 SOLUTION ORAL EVERY 4 HOURS PRN
Status: DISCONTINUED | OUTPATIENT
Start: 2023-01-01 | End: 2023-01-01

## 2023-01-01 RX ORDER — NALOXONE HYDROCHLORIDE 0.4 MG/ML
0.4 INJECTION, SOLUTION INTRAMUSCULAR; INTRAVENOUS; SUBCUTANEOUS
Status: DISCONTINUED | OUTPATIENT
Start: 2023-01-01 | End: 2023-01-01

## 2023-01-01 RX ORDER — MORPHINE SULFATE 20 MG/ML
5 SOLUTION ORAL
Status: DISCONTINUED | OUTPATIENT
Start: 2023-01-01 | End: 2023-01-01 | Stop reason: HOSPADM

## 2023-01-01 RX ORDER — BISACODYL 10 MG
10 SUPPOSITORY, RECTAL RECTAL DAILY PRN
Status: DISCONTINUED | OUTPATIENT
Start: 2023-01-01 | End: 2023-01-01 | Stop reason: HOSPADM

## 2023-01-01 RX ORDER — ALLOPURINOL 300 MG/1
300 TABLET ORAL DAILY
Status: DISCONTINUED | OUTPATIENT
Start: 2023-01-01 | End: 2023-01-01

## 2023-01-01 RX ORDER — HYDRALAZINE HYDROCHLORIDE 10 MG/1
10 TABLET, FILM COATED ORAL 2 TIMES DAILY
COMMUNITY

## 2023-01-01 RX ORDER — MORPHINE SULFATE 10 MG/5ML
10 SOLUTION ORAL
Status: DISCONTINUED | OUTPATIENT
Start: 2023-01-01 | End: 2023-01-01

## 2023-01-01 RX ORDER — NALOXONE HYDROCHLORIDE 0.4 MG/ML
0.2 INJECTION, SOLUTION INTRAMUSCULAR; INTRAVENOUS; SUBCUTANEOUS
Status: DISCONTINUED | OUTPATIENT
Start: 2023-01-01 | End: 2023-01-01

## 2023-01-01 RX ORDER — POLYETHYLENE GLYCOL 3350 17 G/17G
17 POWDER, FOR SOLUTION ORAL DAILY PRN
Status: DISCONTINUED | OUTPATIENT
Start: 2023-01-01 | End: 2023-01-01

## 2023-01-01 RX ORDER — AMOXICILLIN 250 MG
2 CAPSULE ORAL 2 TIMES DAILY PRN
Status: DISCONTINUED | OUTPATIENT
Start: 2023-01-01 | End: 2023-01-01 | Stop reason: HOSPADM

## 2023-01-01 RX ORDER — ONDANSETRON 2 MG/ML
4 INJECTION INTRAMUSCULAR; INTRAVENOUS EVERY 6 HOURS PRN
Status: DISCONTINUED | OUTPATIENT
Start: 2023-01-01 | End: 2023-01-01

## 2023-01-01 RX ORDER — HYDROMORPHONE HYDROCHLORIDE 2 MG/1
2 TABLET ORAL
Status: DISCONTINUED | OUTPATIENT
Start: 2023-01-01 | End: 2023-01-01

## 2023-01-01 RX ORDER — LORAZEPAM 2 MG/ML
1 INJECTION INTRAMUSCULAR
Status: DISCONTINUED | OUTPATIENT
Start: 2023-01-01 | End: 2023-01-01 | Stop reason: HOSPADM

## 2023-01-01 RX ORDER — LORAZEPAM 1 MG/1
1 TABLET ORAL
Status: DISCONTINUED | OUTPATIENT
Start: 2023-01-01 | End: 2023-01-01 | Stop reason: HOSPADM

## 2023-01-01 RX ORDER — MORPHINE SULFATE 10 MG/5ML
5 SOLUTION ORAL
Status: DISCONTINUED | OUTPATIENT
Start: 2023-01-01 | End: 2023-01-01 | Stop reason: HOSPADM

## 2023-01-01 RX ORDER — CARVEDILOL 3.12 MG/1
3.12 TABLET ORAL 2 TIMES DAILY WITH MEALS
Status: DISCONTINUED | OUTPATIENT
Start: 2023-01-01 | End: 2023-01-01

## 2023-01-01 RX ORDER — MORPHINE SULFATE 10 MG/5ML
10 SOLUTION ORAL
Status: DISCONTINUED | OUTPATIENT
Start: 2023-01-01 | End: 2023-01-01 | Stop reason: HOSPADM

## 2023-01-01 RX ORDER — MAGNESIUM OXIDE 400 MG/1
400 TABLET ORAL EVERY 4 HOURS
Status: COMPLETED | OUTPATIENT
Start: 2023-01-01 | End: 2023-01-01

## 2023-01-01 RX ORDER — HYDROMORPHONE HYDROCHLORIDE 1 MG/ML
0.5 INJECTION, SOLUTION INTRAMUSCULAR; INTRAVENOUS; SUBCUTANEOUS
Status: DISCONTINUED | OUTPATIENT
Start: 2023-01-01 | End: 2023-01-01 | Stop reason: HOSPADM

## 2023-01-01 RX ORDER — HYDROMORPHONE HYDROCHLORIDE 1 MG/ML
1 SOLUTION ORAL
Status: DISCONTINUED | OUTPATIENT
Start: 2023-01-01 | End: 2023-01-01

## 2023-01-01 RX ORDER — SALIVA STIMULANT COMB. NO.3
2 SPRAY, NON-AEROSOL (ML) MUCOUS MEMBRANE
Status: DISCONTINUED | OUTPATIENT
Start: 2023-01-01 | End: 2023-01-01 | Stop reason: HOSPADM

## 2023-01-01 RX ORDER — ASPIRIN 81 MG/1
81 TABLET, CHEWABLE ORAL DAILY
Status: DISCONTINUED | OUTPATIENT
Start: 2023-01-01 | End: 2023-01-01

## 2023-01-01 RX ORDER — FUROSEMIDE 20 MG
20 TABLET ORAL DAILY
Status: DISCONTINUED | OUTPATIENT
Start: 2023-01-01 | End: 2023-01-01

## 2023-01-01 RX ORDER — MORPHINE SULFATE 20 MG/ML
10 SOLUTION ORAL
Status: DISCONTINUED | OUTPATIENT
Start: 2023-01-01 | End: 2023-01-01 | Stop reason: HOSPADM

## 2023-01-01 RX ORDER — NALOXONE HYDROCHLORIDE 0.4 MG/ML
0.2 INJECTION, SOLUTION INTRAMUSCULAR; INTRAVENOUS; SUBCUTANEOUS
Status: DISCONTINUED | OUTPATIENT
Start: 2023-01-01 | End: 2023-01-01 | Stop reason: HOSPADM

## 2023-01-01 RX ORDER — HYDROMORPHONE HYDROCHLORIDE 1 MG/ML
0.3 INJECTION, SOLUTION INTRAMUSCULAR; INTRAVENOUS; SUBCUTANEOUS
Status: DISCONTINUED | OUTPATIENT
Start: 2023-01-01 | End: 2023-01-01 | Stop reason: HOSPADM

## 2023-01-01 RX ORDER — PROCHLORPERAZINE MALEATE 5 MG
5 TABLET ORAL EVERY 6 HOURS PRN
Status: DISCONTINUED | OUTPATIENT
Start: 2023-01-01 | End: 2023-01-01 | Stop reason: HOSPADM

## 2023-01-01 RX ORDER — HYDRALAZINE HYDROCHLORIDE 10 MG/1
10 TABLET, FILM COATED ORAL 2 TIMES DAILY
Status: DISCONTINUED | OUTPATIENT
Start: 2023-01-01 | End: 2023-01-01

## 2023-01-01 RX ORDER — GLYCOPYRROLATE 1 MG/1
2 TABLET ORAL EVERY 4 HOURS PRN
Status: DISCONTINUED | OUTPATIENT
Start: 2023-01-01 | End: 2023-01-01 | Stop reason: HOSPADM

## 2023-01-01 RX ORDER — FUROSEMIDE 40 MG
40 TABLET ORAL DAILY
Status: DISCONTINUED | OUTPATIENT
Start: 2023-01-01 | End: 2023-01-01

## 2023-01-01 RX ORDER — HALOPERIDOL 5 MG/ML
1 INJECTION INTRAMUSCULAR
Status: DISCONTINUED | OUTPATIENT
Start: 2023-01-01 | End: 2023-01-01 | Stop reason: HOSPADM

## 2023-01-01 RX ORDER — PROCHLORPERAZINE 25 MG
12.5 SUPPOSITORY, RECTAL RECTAL EVERY 12 HOURS PRN
Status: DISCONTINUED | OUTPATIENT
Start: 2023-01-01 | End: 2023-01-01

## 2023-01-01 RX ORDER — DEXTROSE MONOHYDRATE 50 MG/ML
INJECTION, SOLUTION INTRAVENOUS CONTINUOUS
Status: DISCONTINUED | OUTPATIENT
Start: 2023-01-01 | End: 2023-01-01

## 2023-01-01 RX ORDER — CARBOXYMETHYLCELLULOSE SODIUM 5 MG/ML
1-2 SOLUTION/ DROPS OPHTHALMIC
Status: DISCONTINUED | OUTPATIENT
Start: 2023-01-01 | End: 2023-01-01 | Stop reason: HOSPADM

## 2023-01-01 RX ORDER — HYDROMORPHONE HYDROCHLORIDE 5 MG/5ML
2 SOLUTION ORAL
Status: DISCONTINUED | OUTPATIENT
Start: 2023-01-01 | End: 2023-01-01

## 2023-01-01 RX ORDER — NALOXONE HYDROCHLORIDE 0.4 MG/ML
0.1 INJECTION, SOLUTION INTRAMUSCULAR; INTRAVENOUS; SUBCUTANEOUS
Status: DISCONTINUED | OUTPATIENT
Start: 2023-01-01 | End: 2023-01-01 | Stop reason: HOSPADM

## 2023-01-01 RX ORDER — FUROSEMIDE 20 MG
20 TABLET ORAL ONCE
Status: COMPLETED | OUTPATIENT
Start: 2023-01-01 | End: 2023-01-01

## 2023-01-01 RX ORDER — MORPHINE SULFATE 20 MG/ML
5 SOLUTION ORAL
Status: DISCONTINUED | OUTPATIENT
Start: 2023-01-01 | End: 2023-01-01

## 2023-01-01 RX ORDER — ATROPINE SULFATE 10 MG/ML
2 SOLUTION/ DROPS OPHTHALMIC 3 TIMES DAILY PRN
Status: DISCONTINUED | OUTPATIENT
Start: 2023-01-01 | End: 2023-01-01 | Stop reason: HOSPADM

## 2023-01-01 RX ORDER — CARVEDILOL 12.5 MG/1
12.5 TABLET ORAL 2 TIMES DAILY WITH MEALS
COMMUNITY

## 2023-01-01 RX ORDER — ALLOPURINOL 300 MG/1
300 TABLET ORAL DAILY
COMMUNITY

## 2023-01-01 RX ORDER — ONDANSETRON 4 MG/1
4 TABLET, ORALLY DISINTEGRATING ORAL EVERY 6 HOURS PRN
Status: DISCONTINUED | OUTPATIENT
Start: 2023-01-01 | End: 2023-01-01

## 2023-01-01 RX ORDER — ISOSORBIDE MONONITRATE 30 MG/1
30 TABLET, EXTENDED RELEASE ORAL DAILY
Status: DISCONTINUED | OUTPATIENT
Start: 2023-01-01 | End: 2023-01-01

## 2023-01-01 RX ORDER — MORPHINE SULFATE 10 MG/5ML
5 SOLUTION ORAL
Status: DISCONTINUED | OUTPATIENT
Start: 2023-01-01 | End: 2023-01-01

## 2023-01-01 RX ORDER — PROCHLORPERAZINE MALEATE 5 MG
5 TABLET ORAL EVERY 6 HOURS PRN
Status: DISCONTINUED | OUTPATIENT
Start: 2023-01-01 | End: 2023-01-01

## 2023-01-01 RX ORDER — ONDANSETRON 4 MG/1
4 TABLET, ORALLY DISINTEGRATING ORAL EVERY 6 HOURS PRN
Status: DISCONTINUED | OUTPATIENT
Start: 2023-01-01 | End: 2023-01-01 | Stop reason: HOSPADM

## 2023-01-01 RX ORDER — PROCHLORPERAZINE 25 MG
12.5 SUPPOSITORY, RECTAL RECTAL EVERY 12 HOURS PRN
Status: DISCONTINUED | OUTPATIENT
Start: 2023-01-01 | End: 2023-01-01 | Stop reason: HOSPADM

## 2023-01-01 RX ORDER — ONDANSETRON 2 MG/ML
4 INJECTION INTRAMUSCULAR; INTRAVENOUS EVERY 6 HOURS PRN
Status: DISCONTINUED | OUTPATIENT
Start: 2023-01-01 | End: 2023-01-01 | Stop reason: HOSPADM

## 2023-01-01 RX ORDER — CARVEDILOL 12.5 MG/1
12.5 TABLET ORAL 2 TIMES DAILY WITH MEALS
Status: DISCONTINUED | OUTPATIENT
Start: 2023-01-01 | End: 2023-01-01

## 2023-01-01 RX ORDER — ACETAMINOPHEN 325 MG/1
650 TABLET ORAL EVERY 4 HOURS PRN
Status: DISCONTINUED | OUTPATIENT
Start: 2023-01-01 | End: 2023-01-01 | Stop reason: HOSPADM

## 2023-01-01 RX ORDER — AMOXICILLIN 250 MG
1 CAPSULE ORAL 2 TIMES DAILY PRN
Status: DISCONTINUED | OUTPATIENT
Start: 2023-01-01 | End: 2023-01-01 | Stop reason: HOSPADM

## 2023-01-01 RX ORDER — VITAMIN B COMPLEX
1 TABLET ORAL DAILY
COMMUNITY

## 2023-01-01 RX ORDER — ATORVASTATIN CALCIUM 40 MG/1
40 TABLET, FILM COATED ORAL DAILY
Status: DISCONTINUED | OUTPATIENT
Start: 2023-01-01 | End: 2023-01-01

## 2023-01-01 RX ORDER — SODIUM CHLORIDE 9 MG/ML
INJECTION, SOLUTION INTRAVENOUS CONTINUOUS
Status: DISCONTINUED | OUTPATIENT
Start: 2023-01-01 | End: 2023-01-01

## 2023-01-01 RX ADMIN — ACETAMINOPHEN 650 MG: 325 TABLET ORAL at 05:20

## 2023-01-01 RX ADMIN — ATORVASTATIN CALCIUM 40 MG: 40 TABLET, FILM COATED ORAL at 11:03

## 2023-01-01 RX ADMIN — FUROSEMIDE 20 MG: 20 TABLET ORAL at 11:03

## 2023-01-01 RX ADMIN — HYDRALAZINE HYDROCHLORIDE 10 MG: 10 TABLET, FILM COATED ORAL at 20:17

## 2023-01-01 RX ADMIN — ZOLEDRONIC ACID 4 MG: 4 INJECTION, SOLUTION, CONCENTRATE INTRAVENOUS at 13:42

## 2023-01-01 RX ADMIN — HYDRALAZINE HYDROCHLORIDE 10 MG: 10 TABLET, FILM COATED ORAL at 08:44

## 2023-01-01 RX ADMIN — HYDROMORPHONE HYDROCHLORIDE 1 MG: 5 SOLUTION ORAL at 12:55

## 2023-01-01 RX ADMIN — SODIUM CHLORIDE: 9 INJECTION, SOLUTION INTRAVENOUS at 20:13

## 2023-01-01 RX ADMIN — SODIUM CHLORIDE: 9 INJECTION, SOLUTION INTRAVENOUS at 13:18

## 2023-01-01 RX ADMIN — ISOSORBIDE MONONITRATE 30 MG: 30 TABLET, EXTENDED RELEASE ORAL at 08:44

## 2023-01-01 RX ADMIN — ALLOPURINOL 300 MG: 300 TABLET ORAL at 11:02

## 2023-01-01 RX ADMIN — ONDANSETRON 4 MG: 2 INJECTION INTRAMUSCULAR; INTRAVENOUS at 06:06

## 2023-01-01 RX ADMIN — SODIUM CHLORIDE, POTASSIUM CHLORIDE, SODIUM LACTATE AND CALCIUM CHLORIDE 1000 ML: 600; 310; 30; 20 INJECTION, SOLUTION INTRAVENOUS at 12:24

## 2023-01-01 RX ADMIN — Medication 400 MG: at 00:58

## 2023-01-01 RX ADMIN — ISOSORBIDE MONONITRATE 30 MG: 30 TABLET, EXTENDED RELEASE ORAL at 11:03

## 2023-01-01 RX ADMIN — FUROSEMIDE 20 MG: 20 TABLET ORAL at 12:00

## 2023-01-01 RX ADMIN — ASPIRIN 81 MG CHEWABLE TABLET 81 MG: 81 TABLET CHEWABLE at 08:44

## 2023-01-01 RX ADMIN — ATORVASTATIN CALCIUM 40 MG: 40 TABLET, FILM COATED ORAL at 09:28

## 2023-01-01 RX ADMIN — ATORVASTATIN CALCIUM 40 MG: 40 TABLET, FILM COATED ORAL at 08:44

## 2023-01-01 RX ADMIN — HYDRALAZINE HYDROCHLORIDE 10 MG: 10 TABLET, FILM COATED ORAL at 09:28

## 2023-01-01 RX ADMIN — ASPIRIN 81 MG CHEWABLE TABLET 81 MG: 81 TABLET CHEWABLE at 09:28

## 2023-01-01 RX ADMIN — DEXTROSE MONOHYDRATE: 50 INJECTION, SOLUTION INTRAVENOUS at 09:41

## 2023-01-01 RX ADMIN — HYDROMORPHONE HYDROCHLORIDE 0.5 MG: 5 SOLUTION ORAL at 06:11

## 2023-01-01 RX ADMIN — ISOSORBIDE MONONITRATE 30 MG: 30 TABLET, EXTENDED RELEASE ORAL at 13:27

## 2023-01-01 RX ADMIN — CARVEDILOL 3.12 MG: 3.12 TABLET, FILM COATED ORAL at 18:18

## 2023-01-01 RX ADMIN — HYDRALAZINE HYDROCHLORIDE 10 MG: 10 TABLET, FILM COATED ORAL at 22:02

## 2023-01-01 RX ADMIN — SODIUM CHLORIDE: 9 INJECTION, SOLUTION INTRAVENOUS at 08:44

## 2023-01-01 RX ADMIN — HYDRALAZINE HYDROCHLORIDE 10 MG: 10 TABLET, FILM COATED ORAL at 11:01

## 2023-01-01 RX ADMIN — CARVEDILOL 12.5 MG: 6.25 TABLET, FILM COATED ORAL at 21:04

## 2023-01-01 RX ADMIN — PANTOPRAZOLE SODIUM 40 MG: 40 INJECTION, POWDER, FOR SOLUTION INTRAVENOUS at 09:25

## 2023-01-01 RX ADMIN — CARVEDILOL 12.5 MG: 6.25 TABLET, FILM COATED ORAL at 09:28

## 2023-01-01 RX ADMIN — FUROSEMIDE 40 MG: 10 INJECTION, SOLUTION INTRAMUSCULAR; INTRAVENOUS at 11:40

## 2023-01-01 RX ADMIN — ATROPINE SULFATE 2 DROP: 10 SOLUTION/ DROPS OPHTHALMIC at 21:15

## 2023-01-01 RX ADMIN — SODIUM CHLORIDE: 9 INJECTION, SOLUTION INTRAVENOUS at 05:47

## 2023-01-01 RX ADMIN — ALLOPURINOL 300 MG: 300 TABLET ORAL at 09:28

## 2023-01-01 RX ADMIN — ALLOPURINOL 300 MG: 300 TABLET ORAL at 08:44

## 2023-01-01 RX ADMIN — SODIUM CHLORIDE: 9 INJECTION, SOLUTION INTRAVENOUS at 20:47

## 2023-01-01 RX ADMIN — FUROSEMIDE 20 MG: 20 TABLET ORAL at 08:44

## 2023-01-01 RX ADMIN — ACETAMINOPHEN 650 MG: 325 TABLET ORAL at 01:05

## 2023-01-01 RX ADMIN — SODIUM CHLORIDE, POTASSIUM CHLORIDE, SODIUM LACTATE AND CALCIUM CHLORIDE 1000 ML: 600; 310; 30; 20 INJECTION, SOLUTION INTRAVENOUS at 14:29

## 2023-01-01 RX ADMIN — Medication 400 MG: at 05:10

## 2023-01-01 RX ADMIN — HYDROMORPHONE HYDROCHLORIDE 1 MG: 5 SOLUTION ORAL at 14:12

## 2023-01-01 RX ADMIN — FUROSEMIDE 20 MG: 20 TABLET ORAL at 09:28

## 2023-01-01 RX ADMIN — SODIUM CHLORIDE: 9 INJECTION, SOLUTION INTRAVENOUS at 05:26

## 2023-01-01 RX ADMIN — HYDRALAZINE HYDROCHLORIDE 10 MG: 10 TABLET, FILM COATED ORAL at 20:57

## 2023-01-01 RX ADMIN — ASPIRIN 81 MG CHEWABLE TABLET 81 MG: 81 TABLET CHEWABLE at 11:01

## 2023-01-01 RX ADMIN — MORPHINE SULFATE 10 MG: 100 SOLUTION ORAL at 18:49

## 2023-01-01 RX ADMIN — HYDRALAZINE HYDROCHLORIDE 10 MG: 10 TABLET, FILM COATED ORAL at 20:20

## 2023-01-01 RX ADMIN — ATROPINE SULFATE 2 DROP: 10 SOLUTION/ DROPS OPHTHALMIC at 18:49

## 2023-01-01 RX ADMIN — HYDROMORPHONE HYDROCHLORIDE 0.5 MG: 5 SOLUTION ORAL at 10:05

## 2023-01-01 RX ADMIN — HYDROMORPHONE HYDROCHLORIDE 0.5 MG: 1 INJECTION, SOLUTION INTRAMUSCULAR; INTRAVENOUS; SUBCUTANEOUS at 21:17

## 2023-01-01 RX ADMIN — PROCHLORPERAZINE EDISYLATE 5 MG: 5 INJECTION INTRAMUSCULAR; INTRAVENOUS at 01:12

## 2023-01-01 RX ADMIN — CARVEDILOL 3.12 MG: 3.12 TABLET, FILM COATED ORAL at 08:47

## 2023-01-01 ASSESSMENT — ACTIVITIES OF DAILY LIVING (ADL)
ADLS_ACUITY_SCORE: 31
ADLS_ACUITY_SCORE: 37
ADLS_ACUITY_SCORE: 43
ADLS_ACUITY_SCORE: 39
TRANSFERRING: 1-->ASSISTANCE (EQUIPMENT/PERSON) NEEDED (NOT DEVELOPMENTALLY APPROPRIATE)
NUMBER_OF_TIMES_PATIENT_HAS_FALLEN_WITHIN_LAST_SIX_MONTHS: 1
ADLS_ACUITY_SCORE: 43
ADLS_ACUITY_SCORE: 31
ADLS_ACUITY_SCORE: 43
WEAR_GLASSES_OR_BLIND: NO
ADLS_ACUITY_SCORE: 33
ADLS_ACUITY_SCORE: 33
ADLS_ACUITY_SCORE: 37
ADLS_ACUITY_SCORE: 31
ADLS_ACUITY_SCORE: 39
ADLS_ACUITY_SCORE: 35
ADLS_ACUITY_SCORE: 31
TOILETING_ISSUES: NO
ADLS_ACUITY_SCORE: 37
ADLS_ACUITY_SCORE: 31
ADLS_ACUITY_SCORE: 37
ADLS_ACUITY_SCORE: 39
ADLS_ACUITY_SCORE: 37
WALKING_OR_CLIMBING_STAIRS_DIFFICULTY: YES
ADLS_ACUITY_SCORE: 37
WALKING_OR_CLIMBING_STAIRS: STAIR CLIMBING DIFFICULTY, REQUIRES EQUIPMENT;STAIR CLIMBING DIFFICULTY, ASSISTANCE 1 PERSON
ADLS_ACUITY_SCORE: 37
FALL_HISTORY_WITHIN_LAST_SIX_MONTHS: YES
ADLS_ACUITY_SCORE: 39
ADLS_ACUITY_SCORE: 37
ADLS_ACUITY_SCORE: 39
ADLS_ACUITY_SCORE: 37
ADLS_ACUITY_SCORE: 37
TRANSFERRING: 1-->ASSISTANCE (EQUIPMENT/PERSON) NEEDED
ADLS_ACUITY_SCORE: 39
ADLS_ACUITY_SCORE: 37
ADLS_ACUITY_SCORE: 39
DOING_ERRANDS_INDEPENDENTLY_DIFFICULTY: YES
ADLS_ACUITY_SCORE: 37
ADLS_ACUITY_SCORE: 43
ADLS_ACUITY_SCORE: 39
ADLS_ACUITY_SCORE: 39
EQUIPMENT_CURRENTLY_USED_AT_HOME: GRAB BAR, TOILET;GRAB BAR, TUB/SHOWER
ADLS_ACUITY_SCORE: 39
ADLS_ACUITY_SCORE: 37
ADLS_ACUITY_SCORE: 37
ADLS_ACUITY_SCORE: 31
ADLS_ACUITY_SCORE: 37
DRESSING/BATHING_DIFFICULTY: NO
ADLS_ACUITY_SCORE: 37
ADLS_ACUITY_SCORE: 39
ADLS_ACUITY_SCORE: 37
ADLS_ACUITY_SCORE: 39
CONCENTRATING,_REMEMBERING_OR_MAKING_DECISIONS_DIFFICULTY: YES
ADLS_ACUITY_SCORE: 37
CHANGE_IN_FUNCTIONAL_STATUS_SINCE_ONSET_OF_CURRENT_ILLNESS/INJURY: YES
DIFFICULTY_EATING/SWALLOWING: NO
ADLS_ACUITY_SCORE: 39
ADLS_ACUITY_SCORE: 43
ADLS_ACUITY_SCORE: 37
ADLS_ACUITY_SCORE: 43
ADLS_ACUITY_SCORE: 37
ADLS_ACUITY_SCORE: 37

## 2023-10-04 PROBLEM — R09.02 HYPOXIA: Status: ACTIVE | Noted: 2023-01-01

## 2023-10-04 PROBLEM — C34.90 MALIGNANT NEOPLASM OF BRONCHUS AND LUNG (H): Status: ACTIVE | Noted: 2023-01-01

## 2023-10-04 PROBLEM — E83.52 HYPERCALCEMIA: Status: ACTIVE | Noted: 2023-01-01

## 2023-10-04 NOTE — ED PROVIDER NOTES
EMERGENCY DEPARTMENT ENCOUNTER      NAME: Akin Evans  AGE: 77 year old male  YOB: 1945  MRN: [id number]  EVALUATION DATE & TIME: 10/4/2023 11:31 AM    PCP: No primary care provider on file.    ED PROVIDER: Evan Martinez MD      Chief Complaint   Patient presents with    Back Pain     Fell two weeks ago, complaining of upper/mid back pain         FINAL IMPRESSION:  No diagnosis found.      ED COURSE & MEDICAL DECISION MAKING:    Pertinent Labs & Imaging studies reviewed. (See chart for details)  77 year old male presents to the Emergency Department for evaluation of back pain and rib pain after a fall.    Patient is a 77-year-old male with intellectual disability companied by his brother who acts as his medical decision-maker.  Patient had a ground-level fall 2 weeks ago at home and has been complaining of some back pain and left-sided rib pain since.  Initially during the fall he stumbled and fell over a chair.  Was seen at a doctor's office at that point time diagnosed with hip strain but no fractures as he was having hip pain at the time.  However since then has been getting some mid lower back pain as well as pain in the left ribs.  No shortness of breath however he has some generalized fatigue at home.  The brother notes that he had some decreased appetite and has lost about 10 pounds over the past several weeks.  Denies any chest pain at the moment.  No head or neck pain.  No numbness or weakness in the arms or legs.  Denies any bowel or bladder incontinence.  Endorses some generalized weakness in the legs but no paresthesias or numbness.    On exam patient does not appear in any acute distress.  No evidence of trauma to the head or neck.  He does have some tenderness in the left posterior ribs but no particular midline tenderness.  No overlying bruising or crepitus.  Abdomen soft benign.  He has symmetrical strength with flexion and extension of the hip bilaterally as well as flexion  extension of the lower leg and dorsi and plantarflexion.  Intact sensation dorsal and plantar skin of the feet.  No saddle anesthesia.    Given his fall no back pain rib pain will obtain a CT of the thorax and thoracic spine to evaluate for any question fractures or rib fractures or underlying pulmonary contusion or pneumothorax    Labs reviewed and interpreted myself.  Overall labs are notable for hypercalcemia with a calcium of 12.5.  Creatinine is 2.6 which is near the patient's baseline given his CKD.  CBC shows a normal white count and mild anemia with hemoglobin 12.7.  Alk phos is elevated at 4-20.    Fortunately CT scan shows what appears to be a large primary right-sided lung tumor in the upper lobe as well as probable metastatic osseous lesions.  Thoracic spine CT does not show any acute fractures but there are ill-defined bony lesions concerning for metastatic disease.  MRI with contrast is recommended however he does not have any signs of spinal cord compression or cauda equina at this point in time given his underlying CKD will hold off on emergent MRI and gadolinium.    Initially discussed the possibility pursuing outpatient further work-up of his probable primary lung cancer.  However, patient has a new O2 requirement here of 2 L is dropping into the 80s on room air.,  Therefore, given his lung cancer with new hypoxia as well as hypercalcemia believe he benefit from mission to the hospital for ongoing management and to assure no further acute decompensation from a respiratory standpoint.  Patient signed out to a colleague of mine with plan to admit the patient for ongoing evaluation.    ED Course as of 10/04/23 1215   Wed Oct 04, 2023   1138 I met with the patient, obtained history, performed an initial exam, and discussed options and plan for diagnostics and treatment here in the ED.       Medical Decision Making    History:  Supplemental history from: Documented in chart, if applicable  External  Record(s) reviewed: Documented in chart, if applicable.    Work Up:  Chart documentation includes differential considered and any EKGs or imaging independently interpreted by provider, where specified.  In additional to work up documented, I considered the following work up: Documented in chart, if applicable.    External consultation:  Discussion of management with another provider: Documented in chart, if applicable    Complicating factors:  Care impacted by chronic illness: N/A  Care affected by social determinants of health: N/A    Disposition considerations: Admission considered. Patient was signed out to the oncoming physician, disposition pending.       At the conclusion of the encounter I discussed the results of all of the tests and the disposition. The questions were answered. The patient or family acknowledged understanding and was agreeable with the care plan.         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    Per patient's brother, he has had upper left back pain. He fell off a chair 2 weeks ago onto his butt and was treated for a sprained left hip which has since improved. Since the fall, he has been more lethargic and has not appetite. He has lost weight (about 10 lbs) and started having back pain in the past few days that is worse with movement. He has also been coughing and has a sore throat.  "Patient has had worsened balance and is dizzy when walking. This morning he had black stool. He has had a heart rhythm problem in the past. Patient denies any leg pain, fever, abdominal pain, vomiting, or urinary symptoms.        REVIEW OF SYSTEMS   Refer to the [facility]    PAST MEDICAL HISTORY:  No past medical history on file.    PAST SURGICAL HISTORY:  Past Surgical History:   Procedure Laterality Date    CATARACT EXTRACTION Bilateral     ESOPHAGOSCOPY, GASTROSCOPY, DUODENOSCOPY (EGD), COMBINED  2005           CURRENT MEDICATIONS:    acetaminophen (TYLENOL) 325 MG tablet  aspirin 81 mg chewable tablet  atorvastatin (LIPITOR) 40 MG tablet  carvedilol (COREG) 6.25 MG tablet  furosemide (LASIX) 20 MG tablet  isosorbide mononitrate (IMDUR) 30 MG 24 hr tablet  polyethylene glycol (MIRALAX) 17 gram packet  predniSONE (DELTASONE) 20 MG tablet        ALLERGIES:  No Known Allergies    FAMILY HISTORY:  Family History   Problem Relation Age of Onset    Heart Disease Father         CAD    Hypertension Father     Cerebrovascular Disease Father     Valvular heart disease Mother        SOCIAL HISTORY:   Social History     Socioeconomic History    Marital status: Single   Tobacco Use    Smoking status: Former     Packs/day: 1.00     Years: 40.00     Pack years: 40.00     Types: Cigarettes     Quit date: 1995     Years since quittin.7    Smokeless tobacco: Never   Substance and Sexual Activity    Alcohol use: No    Drug use: No       VITALS:  /59   Pulse 59   Temp 97.2  F (36.2  C) (Oral)   Ht 1.549 m (5' 1\")   SpO2 95%   BMI 23.01 kg/m      PHYSICAL EXAM    Constitutional: Well developed, Well nourished, NAD,  HENT: Normocephalic, Atraumatic,  mucous membranes moist,   Neck- trachea midline, No stridor.    Eyes:EOMI, Conjunctiva normal, No discharge.   Respiratory: Normal breath sounds, No respiratory distress, No wheezing.    Cardiovascular: Normal heart rate, Regular rhythm,  No murmurs,   Abdominal: Soft, No " tenderness, No rebound or guarding.     Musculoskeletal: no deformity or malalignment Tenderness to left perispinous muscle, left posterior pain, no bruising or crepitus.  Integument: Warm, Dry, No erythema, No rash.   Neurologic: Alert & oriented x 3   Psychiatric: Affect normal, Cooperative.      LAB:  All pertinent labs reviewed and interpreted.       RADIOLOGY:  Reviewed all pertinent imaging. Please see official radiology report.  CT Thoracic Spine w/o Contrast    (Results Pending)   Chest CT w/o contrast    (Results Pending)       EKG:    Performed at: EKG performed at 12:50 PM    Impression: EKG shows a sinus rhythm with premature ventricular complexes, left axis deviation with a left bundle branch block, no excessive discordance or acute ischemic T wave changes.  Overall EKG appears relatively similar compared to prior.        I have independently reviewed and interpreted the EKG(s) documented above.    PROCEDURES:         [employer] [facility] Documentation:   CMS Diagnoses:              I, Torie Montez, am serving as a scribe to document services personally performed by Evan Martinez MD based on my observation and the provider's statements to me. I, Evan Martinez MD, attest that Torie Montez is acting in a scribe capacity, has observed my performance of the services and has documented them in accordance with my direction.    Evan Martinez MD  [facility] EMERGENCY ROOM  [address] [address]  [phone]        Evan Martinez MD  10/04/23 5365

## 2023-10-04 NOTE — ED NOTES
EMERGENCY DEPARTMENT SIGN OUT NOTE        ED COURSE AND MEDICAL DECISION MAKING  Patient was signed out to me by Dr Evan Martinez at 5:33 PM.     In brief, Akin Evans is a 77 year old male who initially presented with back pain after falling off a chair 2 weeks ago, weight loss, cough. Please see prior physician's note for full HPI and ER course. He was found to have a large likely primary right sided lung tumor in the upper lobe as well as probable metastatic osseous lesions. He was also found to have hypercalcemia. He is hypoxic in the 80s on RA and requiring 2 L NC and thus requires admission.     At time of sign out, disposition was pending admission. He declined transfer. I spoke with the hospitalist who accepted the patient. He was in agreement with the plan. He was admitted in stable condition.     FINAL IMPRESSION    1. Hypoxia    2. Hypercalcemia    3. Malignant neoplasm of bronchus and lung (H)        ED MEDS  Medications   lactated ringers BOLUS 1,000 mL (0 mLs Intravenous Stopped 10/4/23 1615)   lactated ringers BOLUS 1,000 mL (0 mLs Intravenous Stopped 10/4/23 1728)       LAB  Labs Ordered and Resulted from Time of ED Arrival to Time of ED Departure   BASIC METABOLIC PANEL - Abnormal       Result Value    Sodium 139      Potassium 4.3      Chloride 104      Carbon Dioxide (CO2) 22      Anion Gap 13      Urea Nitrogen 74.3 (*)     Creatinine 2.64 (*)     GFR Estimate 24 (*)     Calcium 12.5 (*)     Glucose 113 (*)    HEPATIC FUNCTION PANEL - Abnormal    Protein Total 7.0      Albumin 3.3 (*)     Bilirubin Total 0.4      Alkaline Phosphatase 420 (*)     AST 78 (*)     ALT 46      Bilirubin Direct <0.20     CBC WITH PLATELETS AND DIFFERENTIAL - Abnormal    WBC Count 11.0      RBC Count 3.69 (*)     Hemoglobin 12.7 (*)     Hematocrit 39.5 (*)      (*)     MCH 34.4 (*)     MCHC 32.2      RDW 17.7 (*)     Platelet Count 141 (*)     % Neutrophils 56      % Lymphocytes 34      % Monocytes 8       Mids % (Monos, Eos, Basos)        % Eosinophils 1      % Basophils 1      % Immature Granulocytes 0      NRBCs per 100 WBC 0      Absolute Neutrophils 6.3      Absolute Lymphocytes 3.7      Absolute Monocytes 0.8      Mids Abs (Monos, Eos, Basos)        Absolute Eosinophils 0.1      Absolute Basophils 0.1      Absolute Immature Granulocytes 0.0      Absolute NRBCs 0.0     LIPASE - Normal    Lipase 50             RADIOLOGY    CT Thoracic Spine w/o Contrast   Final Result   IMPRESSION:   1.  Negative for acute fracture.   2.  Multiple ill-defined lucent bony lesions. This is favored to represent osseous metastatic disease and further evaluation with contrast-enhanced MRI of the cervical and thoracic spine is recommended.   3.  Please refer to separately dictated CT chest for intrathoracic findings.         Chest CT w/o contrast   Final Result   IMPRESSION:    Abrupt occlusion of the right upper lobe bronchus with a large area of masslike consolidation in the right upper lobe concerning for primary lung carcinoma. There are also enlarged mediastinal and right hilar lymph nodes, as well as numerous lytic and    destructive osseous lesions, which are likely metastatic. Recommend whole-body staging and tissue sampling.      Dr. Evan Martinez was contacted by me on 10/4/2023 2:25 PM CDT and verbalized understanding of the result.             DISCHARGE MEDS  New Prescriptions    No medications on file       I, Abbie Chavez, am serving as a scribe to document services personally performed by Erin Galvez MD based on my observation and the provider's statements to me. I, Erin Galvez MD, attest that Abbie Chavez is acting in a scribe capacity, has observed my performance of the services and has documented them in accordance with my direction.     Erin Galvez MD  [facility] EMERGENCY ROOM  [address] [address] [phone]       Erin Galvez,  MD  10/05/23 1007

## 2023-10-04 NOTE — ED TRIAGE NOTES
Pt arrives from his home where he lives with his brother. Per EMS, pt fell about 2 weeks ago and has had back pain. Now also has cough. Pt is special needs but [employer]. He is also hard of hearing but does not wear hearing aides.      Triage Assessment       Row Name 10/04/23 1121       Triage Assessment (Adult)    Airway WDL WDL       Respiratory WDL    Respiratory WDL cough    Cough Frequency frequent    Cough Type dry;nonproductive       Skin Circulation/Temperature WDL    Skin Circulation/Temperature WDL WDL       Cardiac WDL    Cardiac WDL WDL       Peripheral/Neurovascular WDL    Peripheral Neurovascular WDL WDL       Cognitive/Neuro/Behavioral WDL    Cognitive/Neuro/Behavioral WDL WDL

## 2023-10-04 NOTE — PHARMACY-ADMISSION MEDICATION HISTORY
Prior to Admission medications    Medication Sig Last Dose Taking? Auth Provider Long Term End Date   acetaminophen (TYLENOL) 325 MG tablet [ACETAMINOPHEN (TYLENOL) 325 MG TABLET] Take 650 mg by mouth every 4 (four) hours as needed. Past Week Yes Provider, Historical     allopurinol (ZYLOPRIM) 300 MG tablet Take 300 mg by mouth daily 10/4/2023 at am Yes Unknown, Entered By History     aspirin 81 mg chewable tablet [ASPIRIN 81 MG CHEWABLE TABLET] Chew 81 mg daily. 10/4/2023 at am Yes Provider, Historical     atorvastatin (LIPITOR) 40 MG tablet [ATORVASTATIN (LIPITOR) 40 MG TABLET] Take 40 mg by mouth daily. 10/4/2023 at am Yes Provider, Historical Yes    carvedilol (COREG) 12.5 MG tablet Take 12.5 mg by mouth 2 times daily (with meals) 10/4/2023 at am Yes Unknown, Entered By History     furosemide (LASIX) 20 MG tablet Take 20 mg by mouth daily 10/4/2023 at am Yes Provider, Historical Yes    hydrALAZINE (APRESOLINE) 10 MG tablet Take 10 mg by mouth 2 times daily 10/4/2023 at am Yes Unknown, Entered By History Yes    isosorbide mononitrate (IMDUR) 30 MG 24 hr tablet [ISOSORBIDE MONONITRATE (IMDUR) 30 MG 24 HR TABLET] Take 30 mg by mouth daily. 10/4/2023 at am Yes Provider, Historical Yes    polyethylene glycol (MIRALAX) 17 gram packet [POLYETHYLENE GLYCOL (MIRALAX) 17 GRAM PACKET] Take 17 g by mouth daily as needed. Past Week Yes Provider, Historical     Vitamin D3 (CHOLECALCIFEROL) 25 mcg (1000 units) tablet Take 1 tablet by mouth daily 10/4/2023 at am Yes Unknown, Entered By History

## 2023-10-04 NOTE — H&P
[facility]    History and Physical - Hospitalist Service       Date of Admission:  10/4/2023    Assessment & Plan      Akin Evans is a 77 year old male with history of developmental disability, essential hypertension, chronic systolic congestive heart failure, nonischemic cardiomyopathy, dyslipidemia, hearing loss, gout, CKD stage III, who presents after suffering a fall 1 week ago in his home and admitted on 10/4/2023 with back and left-sided rib pain.  CT imaging reveals right upper lobe bronchus mass likely primary lung cancer with multiple lytic bone lesions.  Patient admitted for hypercalcemia and lung mass.    Right lung mass  Multiple lytic bone lesions  Unintended weight loss  Hypercalcemia   This is all likely secondary to malignancy.  Likely primary lung cancer.  Pulmonology consultation  Order normal saline 75 cc/h  Repeat base metabolic profile in a.m.  Order PTH, PTHrP.  Hold PTA vitamin D3  Pulmonology consultation    Acute respiratory failure with hypoxia.   Likely secondary to lung mass as above.   Order pro-BNP.   Continue home lasix.     Chronic kidney disease stage III  Baseline creatinine 2.5, admission 2.6.   BMP in a.m.    Developmental delay  Brother is power of     Anemia  Hemoglobin 12.7.  Repeat CBC in a.m.    Gout  Continue allopurinol 300 mg daily.    Dyslipidemia  Atorvastatin 40 mg daily.    Essential hypertension  Heart failure with reduced ejection fraction  Nonischemic cardiomyopathy  Echocardiogram LVEF 30-35% from 2022.   Carvedilol 12.5 mg twice daily, hydralazine 10 mg twice daily, Imdur 30 mg daily.       Diet: Combination Diet Regular Diet Adult  DVT Prophylaxis: Pneumatic Compression Devices  Melo Catheter: Not present  Lines: None     Cardiac Monitoring: None  Code Status: Full Code    Clinically Significant Risk Factors Present on Admission           # Hypercalcemia: Highest Ca = 12.5 mg/dL in last 2 days, will monitor as  appropriate    # Hypoalbuminemia: Lowest albumin = 3.3 g/dL at 10/4/2023 12:24 PM, will monitor as appropriate     # Drug Induced Platelet Defect: home medication list includes an antiplatelet medication     # Hypertension: Noted on problem list                 Disposition Plan      Expected Discharge Date: 10/06/2023                  Santos Pollard DO  Hospitalist Service  [facility]  Securely message with [employer] (more info)  Text page via [employer] Paging/Directory     ______________________________________________________________________    Chief Complaint   Weight loss, anorexia, fall and weakness.     History is obtained from the patient    History of Present Illness   Akin Evans is a 77 year old male with history of developmental disability, essential hypertension, chronic systolic congestive heart failure, nonischemic cardiomyopathy, dyslipidemia, hearing loss, gout, CKD stage III, who presents after suffering a fall 1 week ago in his home and evaluated in the emergency department with back and left-sided rib pain.  His brother assisted with history.  His brother notes that Akin has had decreased appetite and has lost around 10 pounds over the last several weeks.  He is noted increased weakness in his brother.    In the emergency department laboratory findings remarkable for hypercalcemia with calcium of 12.5 and an albumin of 3.3.  Creatinine was elevated 2.6.  CBC showed normal white blood cell count and mild anemia with hemoglobin of 12.7.  Alkaline phosphatase was elevated at 420.  CT chest without contrast revealed multiple lytic lesions involving the right seventh rib, left fourth rib, and L1 and left transverse process, right scapula.  He had a nondisplaced fracture of the left eighth rib medially with associated lytic lesion.  There was also a right upper lobe bronchus masslike consolidation throughout the right upper lobe.  There was a small to moderate right  pleural effusion as well as patchy airspace opacities in the right lower lobe.      Past Medical History    No past medical history on file.    Past Surgical History   Past Surgical History:   Procedure Laterality Date    CATARACT EXTRACTION Bilateral     ESOPHAGOSCOPY, GASTROSCOPY, DUODENOSCOPY (EGD), COMBINED  11/2005       Prior to Admission Medications   Prior to Admission Medications   Prescriptions Last Dose Informant Patient Reported? Taking?   Vitamin D3 (CHOLECALCIFEROL) 25 mcg (1000 units) tablet 10/4/2023 at am  Yes Yes   Sig: Take 1 tablet by mouth daily   acetaminophen (TYLENOL) 325 MG tablet Past Week  Yes Yes   Sig: [ACETAMINOPHEN (TYLENOL) 325 MG TABLET] Take 650 mg by mouth every 4 (four) hours as needed.   allopurinol (ZYLOPRIM) 300 MG tablet 10/4/2023 at am  Yes Yes   Sig: Take 300 mg by mouth daily   aspirin 81 mg chewable tablet 10/4/2023 at am  Yes Yes   Sig: [ASPIRIN 81 MG CHEWABLE TABLET] Chew 81 mg daily.   atorvastatin (LIPITOR) 40 MG tablet 10/4/2023 at am  Yes Yes   Sig: [ATORVASTATIN (LIPITOR) 40 MG TABLET] Take 40 mg by mouth daily.   carvedilol (COREG) 12.5 MG tablet 10/4/2023 at am  Yes Yes   Sig: Take 12.5 mg by mouth 2 times daily (with meals)   furosemide (LASIX) 20 MG tablet 10/4/2023 at am  Yes Yes   Sig: Take 20 mg by mouth daily   hydrALAZINE (APRESOLINE) 10 MG tablet 10/4/2023 at am  Yes Yes   Sig: Take 10 mg by mouth 2 times daily   isosorbide mononitrate (IMDUR) 30 MG 24 hr tablet 10/4/2023 at am  Yes Yes   Sig: [ISOSORBIDE MONONITRATE (IMDUR) 30 MG 24 HR TABLET] Take 30 mg by mouth daily.   polyethylene glycol (MIRALAX) 17 gram packet Past Week  Yes Yes   Sig: [POLYETHYLENE GLYCOL (MIRALAX) 17 GRAM PACKET] Take 17 g by mouth daily as needed.      Facility-Administered Medications: None        Review of Systems    The 10 point Review of Systems is negative other than noted in the HPI.    Social History   I have reviewed this patient's social history and updated it with  pertinent information if needed.  Social History     Tobacco Use    Smoking status: Former     Packs/day: 1.00     Years: 40.00     Pack years: 40.00     Types: Cigarettes     Quit date: 1995     Years since quittin.7    Smokeless tobacco: Never   Substance Use Topics    Alcohol use: No    Drug use: No         Family History   I have reviewed this patient's family history and updated it with pertinent information if needed.  Family History   Problem Relation Age of Onset    Heart Disease Father         CAD    Hypertension Father     Cerebrovascular Disease Father     Valvular heart disease Mother          Allergies   No Known Allergies     Physical Exam   Vital Signs: Temp: 97.2  F (36.2  C) Temp src: Oral BP: (!) 147/73 Pulse: 52     SpO2: 95 % O2 Device: Nasal cannula Oxygen Delivery: 2 LPM  Weight: 117 lbs 0 oz    Constitutional: Well developed, Well nourished, NAD,  HENT: Normocephalic, Atraumatic,  mucous membranes moist,   Neck- trachea midline, No stridor.    Eyes:EOMI, Conjunctiva normal, No discharge.   Respiratory: Normal breath sounds, No respiratory distress, No wheezing.    Cardiovascular: Normal heart rate, Regular rhythm,  No murmurs,   Abdominal: Soft, No tenderness, No rebound or guarding.     Musculoskeletal: no deformity or malalignment.  Integument: Warm, Dry, No erythema, No rash.   Neurologic: Alert & oriented x 3   Psychiatric: Affect normal, Cooperative.     Medical Decision Making       80 MINUTES SPENT BY ME on the date of service doing chart review, history, exam, documentation & further activities per the note.      Data     I have personally reviewed the following data over the past 24 hrs:    11.0  \   12.7 (L)   / 141 (L)     139 104 74.3 (H) /  113 (H)   4.3 22 2.64 (H) \     ALT: 46 AST: 78 (H) AP: 420 (H) TBILI: 0.4   ALB: 3.3 (L) TOT PROTEIN: 7.0 LIPASE: 50     Imaging results reviewed over the past 24 hrs:   Recent Results (from the past 24 hour(s))   Chest CT w/o  contrast    Narrative    EXAM: CT CHEST W/O CONTRAST  LOCATION: [facility]  DATE: 10/4/2023    INDICATION: left posterior rib and back pain after fall, eval for rib fx  COMPARISON: None.  TECHNIQUE: CT chest without IV contrast. Multiplanar reformats were obtained. Dose reduction techniques were used.  CONTRAST: None.    FINDINGS:   LUNGS AND PLEURA: Abrupt occlusion of the right upper lobe bronchus with masslike consolidation throughout the right upper lobe. Small-moderate right pleural effusion also present, as well as patchy airspace opacities in the right lower lobe. Left lung   and pleural space are clear.    MEDIASTINUM/AXILLAE: 4.2 x 4.1 cm right hilar soft tissue mass or lymph node (4/85) and multiple enlarged subcarinal lymph nodes measuring up to 1.4 cm short axis.    CORONARY ARTERY CALCIFICATION: Mild.    UPPER ABDOMEN: Small hiatal hernia.     MUSCULOSKELETAL: Lytic lesion in the right seventh rib laterally (4/104), expansile soft tissue mass in the left fourth rib anteriorly (4/67), destructive soft tissue mass within L1 left transverse process (4/135), and tiny lytic lesion in the right   scapula (4/24). Nondisplaced fracture of the left eighth rib medially with associated lytic lesion (4/58). Upper ribs are difficult to assess for nondisplaced fractures due to patient motion.      Impression    IMPRESSION:   Abrupt occlusion of the right upper lobe bronchus with a large area of masslike consolidation in the right upper lobe concerning for primary lung carcinoma. There are also enlarged mediastinal and right hilar lymph nodes, as well as numerous lytic and   destructive osseous lesions, which are likely metastatic. Recommend whole-body staging and tissue sampling.    Dr. Evan Martinez was contacted by me on 10/4/2023 2:25 PM CDT and verbalized understanding of the result.     CT Thoracic Spine w/o Contrast    Narrative    EXAM: CT THORACIC SPINE W/O CONTRAST  LOCATION: [facility]  DATE: 10/4/2023    INDICATION: thoracic back pain after fall, eval for fx  COMPARISON: None.  TECHNIQUE: Routine CT Thoracic Spine without IV contrast. Multiplanar reformats. Dose reduction techniques were used.     FINDINGS:  VERTEBRA: Exaggerated thoracic kyphosis. Otherwise normal alignment. Vertebral body heights are maintained. Apparent ill-defined lucent lesion of the C7 vertebral body measuring 1.5 x 1.9 cm sagittal image 20 with question destruction of the posterior   vertebral body margin and inferior endplate. Additional suspected osseous lucent lesions are noted at the anterior-inferior corner of T1, posterior margin of T6 to, right posterior superior corner of T3, posterior superior corner of T5, left lateral   aspect of T8 vertebral body, and left anterior superior corner of T11 vertebral body. No fracture or posttraumatic subluxation.     CANAL/FORAMINA: No canal or neural foraminal stenosis. Thoracic DISH is noted.    PARASPINAL: Patient reported separately dictated CT chest 10/04/2023 for intrathoracic findings.      Impression    IMPRESSION:  1.  Negative for acute fracture.  2.  Multiple ill-defined lucent bony lesions. This is favored to represent osseous metastatic disease and further evaluation with contrast-enhanced MRI of the cervical and thoracic spine is recommended.  3.  Please refer to separately dictated CT chest for intrathoracic findings.

## 2023-10-05 NOTE — PROGRESS NOTES
10/05/23 1050   Appointment Info   Signing Clinician's Name / Credentials (OT) HUSSEIN Ocampo/L, CLT   Rehab Comments (OT) OT eval   Living Environment   People in Home alone   Current Living Arrangements apartment   Transportation Anticipated family or friend will provide   Self-Care   Equipment Currently Used at Home grab bar, toilet;grab bar, tub/shower  (regurlar toilet; walk-in shower)   Activity/Exercise/Self-Care Comment pt states completes ADL independently but receives assist with meds, finances, cooking, driving   General Information   Onset of Illness/Injury or Date of Surgery 10/04/23   Referring Physician Dr. Pollard   Patient/Family Therapy Goal Statement (OT) home   Additional Occupational Profile Info/Pertinent History of Current Problem mod: malignant neoplasm of lungs;Akin Evans is a 77 year old male with history of developmental disability, essential hypertension, chronic systolic congestive heart failure, nonischemic cardiomyopathy, dyslipidemia, hearing loss, gout, CKD stage III, who presents after suffering a fall 1 week ago in his home and admitted on 10/4/2023 with back and left-sided rib pain.  CT imaging reveals right upper lobe bronchus mass likely primary lung cancer with multiple lytic bone lesions.  Patient admitted for hypercalcemia and lung mass.  CT and MRI today to complete staging.  Appreciate pulmonology evaluation, considering bronchus stent for collapsed lung associated with mass.   Existing Precautions/Restrictions fall  (O2 currently)   Limitations/Impairments safety/cognitive;hearing   Cognitive Screens/Assessments   Cognitive Assessments Completed ACLS   Mat Cognitive Level Screen (ACLS) Score 3.4   Mat Cognitive Level Screen (ACLS) Domains assessed: learning and problem solving via activity based assessment   Mat Cognitive Level Screen (ACLS) Interpretation Pt requires 24-hour supervision to place objects needed to do activities of daily living in  front of the patient and sequence the patient through necessary steps to achieve acceptable results.  (pt did not have reading glasses present during this screen)   Visual Perception   Visual Impairment/Limitations corrective lenses for reading   Pain Assessment   Patient Currently in Pain No   Range of Motion Comprehensive   Comment, General Range of Motion appears that fingers were enlarged and appeared to have decreased AROM, yet functional and pt was able to complete ACLS task with some increased time.   Coordination   Fine Motor Coordination slightly reduced possibly but able to complete ACLS leather lacing screen   Clinical Impression   Criteria for Skilled Therapeutic Interventions Met (OT) Evaluation only   Influenced by the following impairments malignant neoplasm of lung and possibly abdomen   Clinical Decision Making Complexity (OT) low complexity   Risk & Benefits of therapy have been explained care plan/treatment goals reviewed;patient   OT Total Evaluation Time   OT Eval, Low Complexity Minutes (63226) 30   OT Goals   Therapy Frequency (OT) One time eval and treatment   OT Predicted Duration/Target Date for Goal Attainment 10/05/23   OT Goals OT Goal 1   OT: Goal 1 Pt will complete cog screen to help  determine safe d/c plan.   OT Discharge Planning   OT Plan OT d/c   OT Discharge Recommendation (DC Rec) (S)    (24 hour assist)   OT Rationale for DC Rec Pt scored 3.4 on the ACLS cognitive screen indicating 24 hour supervision. Pt states receives assist from brother with IADL such as driving, medications, finances and cooking. Considering new diagnosis, recommend 24 hour assist.   OT Brief overview of current status 3.4 on ACLS cog screen   Total Session Time   Total Session Time (sum of timed and untimed services) 30

## 2023-10-05 NOTE — PROGRESS NOTES
SPIRITUAL HEALTH SERVICES Progress Note      Saw pt Akin Evans and Brother per admission screen request     Patient/Family Understanding of Illness and Goals of Care - Per  brother Boni is developmentally delayed , unable to read or writer.  Boni was pleasant showing no signs of pain or distress    Distress and Loss - Brother named caregiver stress    Strengths, Coping, and Resources - Boni is well supported by his brother    Meaning, Beliefs, and Spirituality - Boni and brother have a Scientologist background but are non practicing     Plan of Care - Family shared SH[facility] visits would be appreciated for emotional support       Raffy Cantu M.Div., [facility]  Staff    [phone]

## 2023-10-05 NOTE — PROGRESS NOTES
Physical Therapy Discharge Summary    Reason for therapy discharge:    All goals and outcomes met, no further needs identified.    Progress towards therapy goal(s). See goals on Care Plan in [facility] electronic health record for goal details.  Goals met    Therapy recommendation(s):    Continued therapy is recommended.  Rationale/Recommendations:  Home PT for endurance and strengthening.

## 2023-10-05 NOTE — PROGRESS NOTES
10/05/23 0807   Appointment Info   Signing Clinician's Name / Credentials (PT) Clarice Sanchez, PT, DPT   Living Environment   People in Home alone   Current Living Arrangements apartment   Home Accessibility no concerns  (elevator)   Self-Care   Equipment Currently Used at Home walker, rolling  (4WW)   Fall history within last six months yes   Number of times patient has fallen within last six months 1   General Information   Onset of Illness/Injury or Date of Surgery 10/04/23   Referring Physician Dr. Santos Pollard   Patient/Family Therapy Goals Statement (PT) Go Home   Pertinent History of Current Problem (include personal factors and/or comorbidities that impact the POC) fall x 2 weeks ago, lung mass, hypercalcemia, developmental delay   Existing Precautions/Restrictions fall   Weight-Bearing Status - LLE full weight-bearing   Weight-Bearing Status - RLE full weight-bearing   Cognition   Orientation Status (Cognition) disoriented to;time;oriented to;person;place   Bed Mobility   Bed Mobility supine-sit   Supine-Sit [place] (Bed Mobility) contact guard;verbal cues   Transfers   Transfers sit-stand transfer   Maintains Weight-bearing Status (Transfers) able to maintain   Sit-Stand Transfer   Sit-Stand [place] (Transfers) contact guard;verbal cues   Assistive Device (Sit-Stand Transfers) walker, front-wheeled   Gait/Stairs (Locomotion)   [place] Level (Gait) contact guard;verbal cues   Assistive Device (Gait) walker, front-wheeled   Distance in Feet 100x2   Distance in Feet (Gait) 100x2   Pattern (Gait) step-through   Deviations/Abnormal Patterns (Gait) robinson decreased   Maintains Weight-bearing Status (Gait) able to maintain   Clinical Impression   Criteria for Skilled Therapeutic Intervention Yes, treatment indicated   PT Diagnosis (PT) Impaired functional mobility   Influenced by the following impairments weakness, decreased endurance   Functional limitations due to impairments  transfers, gait   Clinical Presentation (PT Evaluation Complexity) Stable/Uncomplicated   Clinical Presentation Rationale Pt presents as medically diagnosed   Clinical Decision Making (Complexity) low complexity   Planned Therapy Interventions (PT) gait training;transfer training;patient/family education   Anticipated Equipment Needs at Discharge (PT) walker, rolling   Risk & Benefits of therapy have been explained evaluation/treatment results reviewed;care plan/treatment goals reviewed;patient   PT Total Evaluation Time   PT Eval, Low Complexity Minutes (11504) 10   Physical Therapy Goals   PT Frequency One time eval and treatment only   PT Predicted Duration/Target Date for Goal Attainment 10/05/23   PT Goals Transfers;Gait   PT: Transfers Supervision/stand-by assist;Sit to/from stand;Assistive device;Goal Met   PT: Gait Supervision/stand-by assist;Rolling walker;Goal Met;100 feet   Interventions   Interventions Quick Adds Gait Training;Therapeutic Activity   Therapeutic Activity   Treatment Detail/Skilled Intervention Supine to sit with HOB elevated, CGA, verbal cueing for safety. Sit<>Stand with FWW, SBA, verbal cueing for safety and hand placement. Sit to supine with SBA. Supine scoot dependent   Gait Training   Gait Training Minutes (38623) 10   Symptoms Noted During/After Treatment (Gait Training) fatigue;shortness of breath   Treatment Detail/Skilled Intervention verbal cueing for safety. Sp02 on RA with ambulation 84%, RN present, placed back on 2L 02. Sp02 on RA at rest 95.   [place] Level (Gait Training) stand-by assist   Physical Assistance Level (Gait Training) verbal cues   Weight Bearing (Gait Training) full weight-bearing   Assistive Device (Gait Training) rolling walker   Pattern Analysis (Gait Training) swing-through gait   Gait Analysis Deviations decreased robinson;decreased step length   Impairments (Gait Analysis/Training) strength decreased   PT Discharge Planning   PT Discharge  Recommendation (DC Rec) (S)  home with assist;home with home care physical therapy   PT Rationale for DC Rec Patient ambulating safely with FWW, not baseline to use FWW, lives alone. Home PT for endurance and mobility training   PT Brief overview of current status Patient amb 100ft with FWW, SBA   Total Session Time   Timed Code Treatment Minutes 10   Total Session Time (sum of timed and untimed services) 20

## 2023-10-05 NOTE — PROGRESS NOTES
[facility]    Medicine Progress Note - Hospitalist Service    Date of Admission:  10/4/2023    Assessment & Plan    Akin Evans is a 77 year old male with history of developmental disability, essential hypertension, chronic systolic congestive heart failure, nonischemic cardiomyopathy, dyslipidemia, hearing loss, gout, CKD stage III, who presents after suffering a fall 1 week ago in his home and admitted on 10/4/2023 with back and left-sided rib pain.  CT imaging reveals right upper lobe bronchus mass likely primary lung cancer with multiple lytic bone lesions.  Patient admitted for hypercalcemia and lung mass.  CT and MRI today to complete staging.  Appreciate pulmonology evaluation.  IR consulted for liver biopsy given CT findings of multiple liver lesions.       Right lung mass  Multiple lytic bone lesions  Liver lesions.  This is all likely secondary to malignancy.  Likely primary lung cancer.  Pulmonology consultation appreciated.   MRI brain for cancer staging.   Spoke with POONAM Love, and agreeable to plan for liver biopsy as ordered by Dr. Rangel.      Hypercalcemia   Calcium improved to 11.4 with fursemide and IV fluids.   PTH suppressed at 9.   PTHrP in process.  Hold PTA vitamin D3  Continue normal saline 75 cc/h  Repeat base metabolic profile in a.m.    Acute respiratory failure with hypoxia.   Likely secondary to lung mass as above.   Order pro-BNP.   Continue home lasix.      Chronic kidney disease stage III  Baseline creatinine 2.5, admission 2.6.   BMP in a.m.     Developmental delay  Brother is power of      Anemia  Hemoglobin 12.7.  Repeat CBC in a.m.     Gout  Continue allopurinol 300 mg daily.     Dyslipidemia  Atorvastatin 40 mg daily.     Essential hypertension  Heart failure with reduced ejection fraction  Nonischemic cardiomyopathy  Echocardiogram LVEF 30-35% from 2022.   Carvedilol 12.5 mg twice daily, hydralazine 10 mg twice daily, Imdur 30 mg  daily.       Diet: Combination Diet Regular Diet Adult    DVT Prophylaxis: Pneumatic Compression Devices  Melo Catheter: Not present  Lines: None     Cardiac Monitoring: None  Code Status: Full Code      Clinically Significant Risk Factors Present on Admission           # Hypercalcemia: Highest Ca = 12.5 mg/dL in last 2 days, will monitor as appropriate    # Hypoalbuminemia: Lowest albumin = 3.3 g/dL at 10/4/2023 12:24 PM, will monitor as appropriate   # Drug Induced Platelet Defect: home medication list includes an antiplatelet medication   # Hypertension: Noted on problem list                 Disposition Plan      Expected Discharge Date: 10/06/2023      Destination: home with family            Santos PollardDO  Hospitalist Service  [facility]  Securely message with [employer] (more info)  Text page via [employer] Paging/Directory   ______________________________________________________________________    Interval History   No new complaints.  Continues on supplemental oxygen at a rate of 2L/min via nasal cannula.     Physical Exam   Vital Signs: Temp: 96.8  F (36  C) Temp src: Axillary BP: 124/58 Pulse: 67   Resp: 23 SpO2: 97 % O2 Device: Nasal cannula Oxygen Delivery: 2 LPM  Weight: 117 lbs 0 oz    Constitutional: Well developed, Well nourished, NAD,  HENT: Normocephalic, Atraumatic,  mucous membranes moist,   Neck- trachea midline, No stridor.    Eyes:EOMI, Conjunctiva normal, No discharge.   Respiratory: Normal breath sounds, No respiratory distress, No wheezing.    Cardiovascular: Normal heart rate, Regular rhythm,  No murmurs,   Abdominal: Soft, No tenderness, No rebound or guarding.     Musculoskeletal: no deformity or malalignment.  Integument: Warm, Dry, No erythema, No rash.   Neurologic: Alert & oriented x 3   Psychiatric: Affect normal, Cooperative.     Medical Decision Making   40 MINUTES SPENT BY ME on the date of service doing chart review, history, exam, documentation &  further activities per the note.      Data     I have personally reviewed the following data over the past 24 hrs:    8.9  \   11.5 (L)   / 118 (L)     141 109 (H) 57.5 (H) /  101 (H)   4.1 23 1.79 (H) \     ALT: 46 AST: 78 (H) AP: 420 (H) TBILI: 0.4   ALB: 3.3 (L) TOT PROTEIN: 7.0 LIPASE: 50       Imaging results reviewed over the past 24 hrs:   Recent Results (from the past 24 hour(s))   Chest CT w/o contrast    Narrative    EXAM: CT CHEST W/O CONTRAST  LOCATION: [facility]  DATE: 10/4/2023    INDICATION: left posterior rib and back pain after fall, eval for rib fx  COMPARISON: None.  TECHNIQUE: CT chest without IV contrast. Multiplanar reformats were obtained. Dose reduction techniques were used.  CONTRAST: None.    FINDINGS:   LUNGS AND PLEURA: Abrupt occlusion of the right upper lobe bronchus with masslike consolidation throughout the right upper lobe. Small-moderate right pleural effusion also present, as well as patchy airspace opacities in the right lower lobe. Left lung   and pleural space are clear.    MEDIASTINUM/AXILLAE: 4.2 x 4.1 cm right hilar soft tissue mass or lymph node (4/85) and multiple enlarged subcarinal lymph nodes measuring up to 1.4 cm short axis.    CORONARY ARTERY CALCIFICATION: Mild.    UPPER ABDOMEN: Small hiatal hernia.     MUSCULOSKELETAL: Lytic lesion in the right seventh rib laterally (4/104), expansile soft tissue mass in the left fourth rib anteriorly (4/67), destructive soft tissue mass within L1 left transverse process (4/135), and tiny lytic lesion in the right   scapula (4/24). Nondisplaced fracture of the left eighth rib medially with associated lytic lesion (4/58). Upper ribs are difficult to assess for nondisplaced fractures due to patient motion.      Impression    IMPRESSION:   Abrupt occlusion of the right upper lobe bronchus with a large area of masslike consolidation in the right upper lobe concerning for primary lung carcinoma. There are also  enlarged mediastinal and right hilar lymph nodes, as well as numerous lytic and   destructive osseous lesions, which are likely metastatic. Recommend whole-body staging and tissue sampling.    Dr. Evan Martinez was contacted by me on 10/4/2023 2:25 PM CDT and verbalized understanding of the result.     CT Thoracic Spine w/o Contrast    Narrative    EXAM: CT THORACIC SPINE W/O CONTRAST  LOCATION: [facility]  DATE: 10/4/2023    INDICATION: thoracic back pain after fall, eval for fx  COMPARISON: None.  TECHNIQUE: Routine CT Thoracic Spine without IV contrast. Multiplanar reformats. Dose reduction techniques were used.     FINDINGS:  VERTEBRA: Exaggerated thoracic kyphosis. Otherwise normal alignment. Vertebral body heights are maintained. Apparent ill-defined lucent lesion of the C7 vertebral body measuring 1.5 x 1.9 cm sagittal image 20 with question destruction of the posterior   vertebral body margin and inferior endplate. Additional suspected osseous lucent lesions are noted at the anterior-inferior corner of T1, posterior margin of T6 to, right posterior superior corner of T3, posterior superior corner of T5, left lateral   aspect of T8 vertebral body, and left anterior superior corner of T11 vertebral body. No fracture or posttraumatic subluxation.     CANAL/FORAMINA: No canal or neural foraminal stenosis. Thoracic DISH is noted.    PARASPINAL: Patient reported separately dictated CT chest 10/04/2023 for intrathoracic findings.      Impression    IMPRESSION:  1.  Negative for acute fracture.  2.  Multiple ill-defined lucent bony lesions. This is favored to represent osseous metastatic disease and further evaluation with contrast-enhanced MRI of the cervical and thoracic spine is recommended.  3.  Please refer to separately dictated CT chest for intrathoracic findings.

## 2023-10-05 NOTE — PROGRESS NOTES
Occupational Therapy Discharge Summary    Reason for therapy discharge:    Discharged to home.    Progress towards therapy goal(s). See goals on Care Plan in [facility] electronic health record for goal details.  Goals met    Therapy recommendation(s):    No further therapy is recommended.

## 2023-10-05 NOTE — CONSULTS
PULMONARY/CRITICAL CARE CONSULT NOTE    Date / Time of Admission:  10/4/2023 11:31 AM  Assessment:   77yoM with history of 40 pack year smoking history, developmental disability, non-ischemic Cardiomyopathy, Hypertension who presented with new lung mass with probable metastasis to bone. CT abdomen/pelvis found liver mets.      Clinically Significant Risk Factors Present on Admission           # Hypercalcemia: Highest Ca = 12.5 mg/dL in last 2 days, will monitor as appropriate    # Hypoalbuminemia: Lowest albumin = 3.3 g/dL at 10/4/2023 12:24 PM, will monitor as appropriate   # Drug Induced Platelet Defect: home medication list includes an antiplatelet medication   # Hypertension: Noted on problem list                     Principal Problem:    Malignant neoplasm of bronchus and lung (H)  Active Problems:    Hypercalcemia    Hypoxia          Plan:   Not sure how successful stenting of the RUL airway would be. Interventional pulmonary can review scan and determine but may start with systemic chemotherapy and radiation once diagnosis determined.  Awaiting MRI brain to rule out brain mets  Hypercalcemia addressed with NS, appreciate hospitalist management.  Hold on antibiotics, unclear there is true post-obstructive pneumonia.   Once diagnosis obtained, overall discussion about goals of care appropriate given the comorbidities and concern Chemotherapy will not be well tolerated.     Subjective:    cc: lung mass    HPI: 77 year old male with history of developmental delay, non-ischemic cardiomyopathy who presented with new onset hypoxia. CTA found new Right lung mass causing occlusion of RUL with numerous lytic lesions on bone. Patient denies dyspnea or bone pain. He provides me minimal additional therapy.     PMHx:  Non-ischemic Cardiomyopathy  Developmental delay  Hypertension    Social History     Tobacco Use    Smoking status: Former     Packs/day: 1.00     Years: 40.00     Pack years: 40.00     Types: Cigarettes      "Quit date: 1995     Years since quittin.7    Smokeless tobacco: Never   Substance Use Topics    Alcohol use: No       Family History   Problem Relation Age of Onset    Heart Disease Father         CAD    Hypertension Father     Cerebrovascular Disease Father     Valvular heart disease Mother        Current Facility-Administered Medications   Medication    acetaminophen (TYLENOL) tablet 650 mg    allopurinol (ZYLOPRIM) tablet 300 mg    aspirin (ASA) chewable tablet 81 mg    atorvastatin (LIPITOR) tablet 40 mg    bisacodyl (DULCOLAX) suppository 10 mg    carvedilol (COREG) tablet 12.5 mg    furosemide (LASIX) tablet 20 mg    hydrALAZINE (APRESOLINE) tablet 10 mg    isosorbide mononitrate (IMDUR) 24 hr tablet 30 mg    melatonin tablet 1 mg    ondansetron (ZOFRAN ODT) ODT tab 4 mg    Or    ondansetron (ZOFRAN) injection 4 mg    polyethylene glycol (MIRALAX) Packet 17 g    polyethylene glycol (MIRALAX) Packet 17 g    prochlorperazine (COMPAZINE) injection 5 mg    Or    prochlorperazine (COMPAZINE) tablet 5 mg    Or    prochlorperazine (COMPAZINE) suppository 12.5 mg    senna-docusate (SENOKOT-S/PERICOLACE) 8.6-50 MG per tablet 1 tablet    Or    senna-docusate (SENOKOT-S/PERICOLACE) 8.6-50 MG per tablet 2 tablet    sodium chloride 0.9% infusion         Review of Systems: 12-point Review performed and negative aside from that noted in HPI.    Objective:    Vital signs:  /58 (BP Location: Left arm)   Pulse 60   Temp 97  F (36.1  C) (Oral)   Resp 20   Ht 1.549 m (5' 1\")   Wt 50.1 kg (110 lb 7.2 oz)   SpO2 96%   BMI 20.87 kg/m      GENERAL APPEARANCE: healthy, alert and no distress     EYES: EOMI, - PERRL     NECK: no adenopathy, no asymmetry, masses, or scars and thyroid normal to palpation     RESP: minimal air movement on right, clear on left.      CV: regular rates and rhythm, normal S1 S2, no S3 or S4 and no murmur, click or rub -     ABDOMEN:  soft, nontender, no HSM or masses and bowel sounds " normal     MS: extremities normal- no gross deformities noted, no evidence of inflammation in joints, FROM in all extremities.     SKIN: no suspicious lesions or rashes     NEURO: Normal strength and tone, sensory exam grossly normal, mentation intact and speech normal     PSYCH: mentation appears normal. and affect normal/bright     LYMPHATICS: No axillary, cervical, inguinal, or supraclavicular nodes        Data    CT chest:  EXAM: CT ABDOMEN PELVIS W/O CONTRAST  LOCATION: [facility]  DATE: 10/5/2023     INDICATION: lung mass, ?mets. elevated creatinine, unable to do contrast.  COMPARISON: 10/4/2023  TECHNIQUE: CT scan of the abdomen and pelvis was performed without IV contrast. Multiplanar reformats were obtained. Dose reduction techniques were used.  CONTRAST: None.     FINDINGS:   LOWER CHEST: Possibly partially visualized mass within the right lower lobe measuring 4.7 x 4.2 cm better evaluated on recent chest CT. Small to moderate right effusion with atelectasis. Patchy airspace opacity at the right lung base may be related to   pneumonia.     HEPATOBILIARY: Several low-attenuation lesions are noted throughout the liver highly suspicious for liver metastases. For example 1.2 cm segment 8 lesion (series 3, image 27). 2 cm lesion lesion inferior right hepatic lobe (series 3, image 75). 2.5 cm   lesion segment 5 of the liver (series 3, image 80). Segment 3 lesion measuring 1.3 cm (series 3, 2). Segment 7 lesion measuring 1.4 cm (series 3, image 32). Several additional low-attenuation lesions are noted throughout all segments of the liver.   Gallbladder within normal limits.     PANCREAS: Normal.     SPLEEN: Normal.     ADRENAL GLANDS: Normal.     KIDNEYS/BLADDER: Small cyst at the superior pole of the right kidney which is benign and needs no further follow-up. Kidneys are otherwise unremarkable with no evidence of hydronephrosis.     BOWEL: Small left inguinal hernia containing loop of  colon with no evidence of obstruction.     LYMPH NODES: Moderate atherosclerotic disease of the abdominal aorta and its branches with no aneurysmal dilation.     VASCULATURE: Unremarkable.     PELVIC ORGANS: Bladder within normal limits. Small fat-containing right inguinal hernia.     MUSCULOSKELETAL: Degenerative changes of the spine.  1.5 cm lytic area along the right L2 vertebral body. 1.7 x 0.9 cm lytic area within L3 vertebral body. Several lytic areas are noted within the pelvis for example right acetabulum measuring 1.2 cm.   Superior left iliac wing measuring 1.1 cm. Lytic areas noted within the sacrum measuring approximately 1.5 x 1.6 cm.                                                                      IMPRESSION:   1.  Partially visualized mass in the right lower lobe which is better evaluated on recent for possible malignancy. Patchy airspace opacity at the right lung base concerning for superimposed pneumonia.  2.  Small to moderate right effusion.  3.  Multiple low-attenuation lesions noted throughout the liver highly suspicious liver metastases.  4.  Small left inguinal hernia containing a loop of colon with no evidence of obstruction. Consider nonemergent surgical consultation.  5.  Several lytic areas are noted throughout the pelvis and vertebral bodies concerning for bony metastases.    Laboratory:  Results for orders placed or performed during the hospital encounter of 10/04/23   Basic metabolic panel   Result Value Ref Range    Sodium 141 135 - 145 mmol/L    Potassium 4.1 3.4 - 5.3 mmol/L    Chloride 109 (H) 98 - 107 mmol/L    Carbon Dioxide (CO2) 23 22 - 29 mmol/L    Anion Gap 9 7 - 15 mmol/L    Urea Nitrogen 57.5 (H) 8.0 - 23.0 mg/dL    Creatinine 1.79 (H) 0.67 - 1.17 mg/dL    GFR Estimate 39 (L) >60 mL/min/1.73m2    Calcium 11.4 (H) 8.8 - 10.2 mg/dL    Glucose 101 (H) 70 - 99 mg/dL     Lab Results   Component Value Date    WBC 8.9 10/05/2023    HGB 11.5 (L) 10/05/2023    HCT 36.3 (L)  10/05/2023     (H) 10/05/2023     (L) 10/05/2023

## 2023-10-05 NOTE — CONSULTS
Consult: targeted liver biopsy; possible mets    EMR and radiology studies were reviewed. Patient has numerous indeterminate liver lesions suspicious for mets. At least one of these would likely be amenable to percutaneous biopsy. Patient received aspirin this morning and needs to be off of aspirin for 5 days. The earliest the biopsy could take place is Tuesday October 10th. This does not need to be the reason for determining patient disposition as this biopsy is not emergent and can be done in an outpatient setting. Additionally, an MRI of the liver would be helpful for characterizing and choosing the appropriate biopsy site.     This was discussed with Dr. Rangel at 1500 hours.

## 2023-10-05 NOTE — PLAN OF CARE
Problem: Plan of Care - These are the overarching goals to be used throughout the patient stay.    Goal: Plan of Care Review  Description: The Plan of Care Review/Shift note should be completed every shift.  The Outcome Evaluation is a brief statement about your assessment that the patient is improving, declining, or no change.  This information will be displayed automatically on your shift note.  Outcome: Progressing     Problem: Gas Exchange Impaired  Goal: Optimal Gas Exchange  Outcome: Progressing  Intervention: Optimize Oxygenation and Ventilation  Recent Flowsheet Documentation  Taken 10/5/2023 1230 by Noé Gonsales RN  Head of Bed (HOB) Positioning: HOB at 20-30 degrees   Goal Outcome Evaluation:         Pt is developmentally delayed at baseline and has limited understanding. Pt's POONAM khalil - his brother is at bedside and helps care for him at home.   Pt is calm and pleasant. Alert to self and place but disoriented to situation and time.   Pt is SOB with exertion. On 2L O2 sating 96% at rest. Lung sounds are diminished. Pt has non productive cough.   Purewick in place.   IV fluids infusing.   IR consulted with needed MRI.   Pt denies all pain.

## 2023-10-06 NOTE — PLAN OF CARE
Problem: Gas Exchange Impaired  Goal: Optimal Gas Exchange  Intervention: Optimize Oxygenation and Ventilation  Recent Flowsheet Documentation  Taken 10/5/2023 1641 by Rigoberto García, RN  Head of Bed (HOB) Positioning: HOB at 20-30 degrees   Goal Outcome Evaluation:       Decreased lung sounds, maintaining oxygen saturation in low 90's on 2 liters of oxygen per nasal cannula, occasional congested cough.  Bradycardic most of this evening mid 40's to low 60's, Dr Alegria was notified and coreg dose was held, asymptomatic.

## 2023-10-06 NOTE — SIGNIFICANT EVENT
Remote Cross Cover Note    Carvedilol held (& parameters added) this evening due to HR 40s. Asymptomatic.     Favio Ortiz MD  [facility] Medicine Service

## 2023-10-06 NOTE — CONSULTS
Palliative Care Consultation Note  [facility]      Patient: Akin Evans  Date of Admission:  10/4/2023    Requesting Clinician / Team: Dr Pollard  Reason for consult: Goals of care (please involve POONAM (Ivan), goals of care discussion, hospice)       Recommendations & Counseling     GOALS OF CARE:   -Brother Ivan requests (on behalf of Boni and his siblings) to meet with Oncology, to review most likely diagnosis and prognosis, prior to making any other important medical decisions (eg., code status, hospice). He would like to have his other siblings be able to listen to that consultation, be involved in decision making.  Brother Ivan shares that he has heard from 2 different medical providers since here, that they are not 100% certain that he does have cancer and he would value speaking with Oncology at this time.    -Met with patient and his sister Lakia initially this AM, to assess patient's symptoms as well as gauge level of understanding and identify any previous documentation or current wishes related to surrogate healthcare decision makers.  Boni is able to understand questions posed to him regarding his symptoms and communicate about them.  He understands he is at the hospital but he does not fully understand some of the important medical details of what is currently going on with him.  He does name his brother Ivan and his sister Lakia as those he would identify as being helpful in supporting him with medical decisions.  When asked how he feels about getting medical information, he indicates that he would want to have some information (recommend this question be put to him again).     Later, joined by brother Ivan and briefly stepped out of the hospital room to review the role of palliative care, Today also reviewed what conversations both brother and sister have had and heard from medical providers/hospitalists regarding Boni's probable cancer diagnosis, and care  options. Reviewed the imaging they have heard the results about, the possibility of a liver biopsy, concerns re: high calcium, challenges with treatment,and the option to not elect treatment, and shift the focus to comfort and consider hospice.  Asked siblings how much does Bill understand about his test results.  Brother Ivan believes that he likely has overheard some details regarding lesions seen on imaging concerning for cancer but is unsure if he fully understood this (of note patient is also hard of hearing, pocket talker obtained during this consultation, in the patient's room for ease of future communication).  Discussed with patient siblings challenges of reviewing concerning findings with patient today.      At this time, given family's request for expert input from oncology, palliative care discussed with siblings and it was decided to hold off on detailed discussion regarding probable cancer as well as prognosis with pt and siblings until they meets with oncology for more definitive, expert input.    ADVANCE CARE PLANNING:  Today discussed previous documents completed related to power of  as well as medical care decision maker (brother Ivan has been Boni's primary medical family caregiver for the last 30 years since their father passed away; he believes that he submitted to the [facility] previous documentation that identified him as a healthcare proxy).  Will ask honoring choices to see if they can search the electronic medical record. Boni does name his brother Ivan and his sister Lakia as those he would identify as being helpful in supporting him with medical decisions.  He is oriented to the month, season, and understanding of being in the hospital.  No POLST  Code status: Full Code (did discuss CODE STATUS with brother and sister separately as patient's sister did bring up the question of DNR; she indicated she was aware that the outcomes of CPR are not as good if advanced  conditions; reviewed this is correct, and reviewed some of the quoted success rates and potential residual changes (cognitive and functional decline risks); sister later adds she was thinking she had heard this about Boni, but heard it related to her father. Regardless, Boni's siblings are aware of the above, yet do not want to make any care decisions until have discussed Boni's care with Oncology.     MEDICAL MANAGEMENT:   We are not actively managing symptoms at this time. Pt is generally comfortable, at rest but does have some back pain and SOB, both with activity. Has not had any opioids in the last 24 hours.   -Could consider Lidocaine patch to left back region as well as low dose of an opioid to help with both SOB and back pain (eg Dilaudid 0.5 mg PO q 4 hours PRN pain/SOB, to start with).   -Healing Touch (pt has an intellectual disability, but understands symptom questions well; he thought a hand and foot massage might be relaxing; sister agreed; will order yet ensure that provider knows the above, and that pt can stop at any time).     PSYCHOSOCIAL/SPIRITUAL SUPPORT:  Family.   Declines Spiritual Care visit    PLAN:  -Spoke with Hospitalist re: family wish to meet with Oncology to get expert input prior to making any other important medical decision.   -Family aware that Palliative Care is not involved in care over the week-end yet other specialists/medical staff are. They are aware they could visit with hospice over the week-end if wished (undecided at this time, want to wait until meet with Oncology).  -Healing Touch (for hand and foot massage).  -Could consider Lidocaine patch and/or low dose of an opioid to help with both SOB and left back pain (eg Dilaudid 0.5 mg PO q 4 hours PRN pain/SOB, to start with).   -Palliative Care will follow up on Monday. Thank you for the consult and allowing us to aid in the care of Akin Evans.    These recommendations have been discussed with ASHLYN HOBBS.    Alon  Sofía Vu NP  Securely message with Vocera (more info)  Text page via [facility] Paging/Directory     TTS: I have personally spent a total of 110 minutes  today on the unit in review of medical record, consultation with the medical providers and assessment of patient, with more than 50% of this time spent in counseling, coordination of care, and discussion in 2 meetings re: imaging evaluation and diagnostic results to date, recent conversations regarding probable cancer diagnosis and potential evaluation options, prognosis (very briefly discussed imaging suggesting possible metastatic disease, concerning hypercalcemia, hospice), symptom management, risks and benefits of management options, emotional support and development of plan of care.        Assessment      Akin Evans is a 77 year old male with a past medical history of history of developmental disability, essential hypertension, chronic systolic congestive heart failure, nonischemic cardiomyopathy, dyslipidemia, hearing loss, gout, CKD stage III, who presented to the [facility] ED on Oct. 4th after suffering a fall 1 week ago in his home. Admitted on 10/4/2023 with back and left-sided rib pain. Noted to have hypercaclemia, new right sided lung mass, and probable metastases to bone and brain.    Today, the patient was seen for:Goals of care (Goals of care (please involve POAPPLE (Ivan), goals of care discussion, hospice)    Per referring provider note, relevant to Palliative Care consultation,     CT imaging reveals right upper lobe bronchus mass likely primary lung cancer with multiple lytic bone lesions.  CT abdomen/pelvis revealed multiple hepatic lesions, and MRI brain with 7mm cingulate gyrus mass likely metastatic disease.  Appreciate pulmonology evaluation.  IR consulted for liver biopsy given CT findings of multiple liver lesions.  Discussed with pulmonology on 10/6/23 and recommending palliative care consultation given patient would be poor candidate  for chemotherapy or radiation.       Palliative Care Summary:   Met with pt, sister, and brother.  See above for details.    Prognosis, Goals, & Planning:    Functional Status just prior to this current hospitalization:  Pt has a walker. Lives in own apartment. Does not have any other services, other than brother Ivan.     Prognosis, Goals, and/or Advance Care Planning:  Discussed with brother and sister, concerning findings, worrisome for cancer (advanced), options for evaluation/care including comfort and hospice.   Discussed past documents completed related to health care decision making (none on file in [facility] EMR).     Code Status was addressed today:   Yes, discussed current code status of FULL code (what this entails and outcomes; also discussed DNR/DNI). Family want to hold off on this until meet with Oncology.      Patient's decision making preferences: with input from medical clinicians and loved ones        Patient has decision-making capacity today for complex decisions: Boni has an intellectual disability but does have some comprehension. Believe he needs assistance with complex medical decision making.              Coping, Meaning, & Spirituality:   Mood, coping, and/or meaning in the context of serious illness were addressed today: No.     Social:   Single. Has 4 siblings. Was raised Alevism.     Medications:  I have reviewed this patient's medication profile and medications from this hospitalization. Notable medications:reviewed pain medications orders, labs    ROS:  Comprehensive ROS is reviewed and is negative except as here & per HPI:     Physical Exam   Vital Signs with Ranges  Temp:  [96.7  F (35.9  C)-98.4  F (36.9  C)] 98.4  F (36.9  C)  Pulse:  [40-89] 42  Resp:  [19-23] 21  BP: (112-159)/(58-70) 159/67  SpO2:  [91 %-96 %] 91 %  110 lbs 7.21 oz    PHYSICAL EXAM:  Alert, comfortable, congested, able to expectorate when reminded.   Pulm Coarse  CV RRR  Abd soft, non-tender, hypoactive  BS    Data reviewed:  EXAM: CT ABDOMEN PELVIS W/O CONTRAST  LOCATION: [facility]  DATE: 10/5/2023     INDICATION: lung mass, ?mets. elevated creatinine, unable to do contrast.  COMPARISON: 10/4/2023  TECHNIQUE: CT scan of the abdomen and pelvis was performed without IV contrast. Multiplanar reformats were obtained. Dose reduction techniques were used.  CONTRAST: None.     FINDINGS:   LOWER CHEST: Possibly partially visualized mass within the right lower lobe measuring 4.7 x 4.2 cm better evaluated on recent chest CT. Small to moderate right effusion with atelectasis. Patchy airspace opacity at the right lung base may be related to   pneumonia.     HEPATOBILIARY: Several low-attenuation lesions are noted throughout the liver highly suspicious for liver metastases. For example 1.2 cm segment 8 lesion (series 3, image 27). 2 cm lesion lesion inferior right hepatic lobe (series 3, image 75). 2.5 cm   lesion segment 5 of the liver (series 3, image 80). Segment 3 lesion measuring 1.3 cm (series 3, 2). Segment 7 lesion measuring 1.4 cm (series 3, image 32). Several additional low-attenuation lesions are noted throughout all segments of the liver.   Gallbladder within normal limits.     PANCREAS: Normal.     SPLEEN: Normal.     ADRENAL GLANDS: Normal.     KIDNEYS/BLADDER: Small cyst at the superior pole of the right kidney which is benign and needs no further follow-up. Kidneys are otherwise unremarkable with no evidence of hydronephrosis.     BOWEL: Small left inguinal hernia containing loop of colon with no evidence of obstruction.     LYMPH NODES: Moderate atherosclerotic disease of the abdominal aorta and its branches with no aneurysmal dilation.     VASCULATURE: Unremarkable.     PELVIC ORGANS: Bladder within normal limits. Small fat-containing right inguinal hernia.     MUSCULOSKELETAL: Degenerative changes of the spine.  1.5 cm lytic area along the right L2 vertebral body. 1.7 x 0.9 cm lytic  area within L3 vertebral body. Several lytic areas are noted within the pelvis for example right acetabulum measuring 1.2 cm.   Superior left iliac wing measuring 1.1 cm. Lytic areas noted within the sacrum measuring approximately 1.5 x 1.6 cm.                                                                      IMPRESSION:   1.  Partially visualized mass in the right lower lobe which is better evaluated on recent for possible malignancy. Patchy airspace opacity at the right lung base concerning for superimposed pneumonia.  2.  Small to moderate right effusion.  3.  Multiple low-attenuation lesions noted throughout the liver highly suspicious liver metastases.  4.  Small left inguinal hernia containing a loop of colon with no evidence of obstruction. Consider nonemergent surgical consultation.  5.  Several lytic areas are noted throughout the pelvis and vertebral bodies concerning for bony metastases.    Results for orders placed or performed during the hospital encounter of 10/04/23 (from the past 24 hour(s))   MR Brain w/o Contrast    Narrative    EXAM: MR BRAIN W/O CONTRAST  LOCATION: [facility]  DATE: 10/5/2023    INDICATION:  Assessment intracranial metastases with lung mass, staging.  COMPARISON: None.  TECHNIQUE: Routine multiplanar multisequence head MRI without intravenous contrast.    FINDINGS:  INTRACRANIAL CONTENTS: No acute or subacute infarct.  Subtle focus of subcortical diffusion restriction in the left single gyrus measuring 7 mm (series 3, image 19) without significant surrounding edema. No acute hemorrhage or extra-axial fluid   collections. Scattered nonspecific T2/FLAIR hyperintensities within the cerebral white matter most consistent with mild chronic microvascular ischemic change. Small chronic right cerebellar infarcts. Mild to moderate generalized cerebral atrophy. No   hydrocephalus. Normal position of the cerebellar tonsils.     SELLA: No abnormality accounting  for technique.    OSSEOUS STRUCTURES/SOFT TISSUES: Normal marrow signal. The major intracranial vascular flow voids are maintained.     ORBITS: No abnormality accounting for technique.     SINUSES/MASTOIDS: No paranasal sinus mucosal disease. No middle ear or mastoid effusion.       Impression    IMPRESSION:  1.  7 mm subcortical lesion in the left cingulate gyrus suspicious for intracranial metastasis given the patient's history, however metastases typically demonstrate more profound surrounding edema. Contrast enhanced MRI is recommended to further   characterize.  2.  No mass effect or midline shift.   Magnesium   Result Value Ref Range    Magnesium 2.3 1.7 - 2.3 mg/dL   Basic metabolic panel   Result Value Ref Range    Sodium 144 135 - 145 mmol/L    Potassium 4.3 3.4 - 5.3 mmol/L    Chloride 109 (H) 98 - 107 mmol/L    Carbon Dioxide (CO2) 27 22 - 29 mmol/L    Anion Gap 8 7 - 15 mmol/L    Urea Nitrogen 52.8 (H) 8.0 - 23.0 mg/dL    Creatinine 1.57 (H) 0.67 - 1.17 mg/dL    GFR Estimate 45 (L) >60 mL/min/1.73m2    Calcium 11.6 (H) 8.8 - 10.2 mg/dL    Glucose 114 (H) 70 - 99 mg/dL   CBC with platelets   Result Value Ref Range    WBC Count 10.6 4.0 - 11.0 10e3/uL    RBC Count 3.55 (L) 4.40 - 5.90 10e6/uL    Hemoglobin 12.1 (L) 13.3 - 17.7 g/dL    Hematocrit 38.0 (L) 40.0 - 53.0 %     (H) 78 - 100 fL    MCH 34.1 (H) 26.5 - 33.0 pg    MCHC 31.8 31.5 - 36.5 g/dL    RDW 17.6 (H) 10.0 - 15.0 %    Platelet Count 131 (L) 150 - 450 10e3/uL

## 2023-10-06 NOTE — PROGRESS NOTES
Care Management Follow Up    Length of Stay (days): 2    Expected Discharge Date: 10/07/2023     Concerns to be Addressed: patient with underlying developmental disabilities and new cancer diagnosis  Patient plan of care discussed at interdisciplinary rounds: Yes    Anticipated Discharge Disposition: Home with home care  Disposition Comments: Needs further assessment and initial CM note  Anticipated Discharge Services: None  Anticipated Discharge DME: None    Patient/family educated on Medicare website which has current facility and service quality ratings: no  Education Provided on the Discharge Plan: No  Patient/Family in Agreement with the Plan: unable to assess    Referrals Placed by CM/SW:  (Not currently indicated)  Private pay costs discussed: Not applicable    Additional Information:  Met with patient and sister Lakia who was at bedside. Patient will be seen by Palliative today. Lakia shares that family is leaning toward quality vs. quantity for care goals of patient. They anticipate being able to continue to care for patient at home. They would be accepting of home care if that is recommended - referral made. Palliative will be seeing patient today as well. CM will continue to follow and assist with discharge plans and needs as warranted.    Blessing Beauchamp LGSW

## 2023-10-06 NOTE — PROGRESS NOTES
[facility]    Medicine Progress Note - Hospitalist Service    Date of Admission:  10/4/2023    Assessment & Plan   Akin Evans is a 77 year old male with history of developmental disability, essential hypertension, chronic systolic congestive heart failure, nonischemic cardiomyopathy, dyslipidemia, hearing loss, gout, CKD stage III, who presents after suffering a fall 1 week ago in his home and admitted on 10/4/2023 with back and left-sided rib pain.  Patient admitted for hypercaclemia, new right sided lung mass, and probable metastases to bone and brain.     CT imaging reveals right upper lobe bronchus mass likely primary lung cancer with multiple lytic bone lesions.  CT abdomen/pelvis revealed multiple hepatic lesions, and MRI brain with 7mm cingulate gyrus mass likely metastatic disease.  Appreciate pulmonology evaluation.  IR consulted for liver biopsy given CT findings of multiple liver lesions.  Discussed with pulmonology on 10/6/23 and recommending palliative care consultation given patient would be poor candidate for chemotherapy or radiation.  Family is requesting oncology input prior to making decision about care plan.     Right lung mass  Multiple lytic bone lesions  Liver lesions.  Brain lesion.  This is all likely secondary to malignancy.  Likely primary lung cancer.  Pulmonology consultation appreciated.   Spoke with POONAM Love, and agreeable to plan for liver biopsy as ordered by Dr. Rangel.   Oncology consultation.     Hypercalcemia   Calcium improved to 11.4 with fursemide and IV fluids.   PTH suppressed at 9.   PTHrP in process.  Hold PTA vitamin D3  Continue normal saline 75 cc/h  Repeat base metabolic profile in a.m.    Acute respiratory failure with hypoxia.   Likely secondary to lung mass as above.   Order pro-BNP.   Continue home lasix.      Chronic kidney disease stage III  Baseline creatinine 2.5, admission 2.6.   Creatinine improved to 1.8 on 10/5.       Developmental delay  Brother is power of      Anemia  Hemoglobin 12.7->11.5 from dilution likely.     Gout  Continue allopurinol 300 mg daily.     Dyslipidemia  Atorvastatin 40 mg daily.     Essential hypertension  Heart failure with reduced ejection fraction  Nonischemic cardiomyopathy  Echocardiogram LVEF 30-35% from 2022.   Carvedilol 12.5 mg twice daily, hydralazine 10 mg twice daily, Imdur 30 mg daily.       Diet: Combination Diet Regular Diet Adult    DVT Prophylaxis: Pneumatic Compression Devices  Melo Catheter: Not present  Lines: None     Cardiac Monitoring: None  Code Status: Full Code      Clinically Significant Risk Factors           # Hypercalcemia: Highest Ca = 12.5 mg/dL in last 2 days, will monitor as appropriate    # Hypoalbuminemia: Lowest albumin = 3.3 g/dL at 10/4/2023 12:24 PM, will monitor as appropriate   # Thrombocytopenia: Lowest platelets = 118 in last 2 days, will monitor for bleeding   # Hypertension: Noted on problem list                   Disposition Plan      Expected Discharge Date: 10/07/2023      Destination: home with family  Discharge Comments: Liver biopsy on 10/6/23.          Santos Pollard DO  Hospitalist Service  [facility]  Securely message with Touchbase (more info)  Text page via [employer] Paging/Directory   ______________________________________________________________________    Interval History   Bill feels okay today.  His sister, Lakia, was present and we discussed MRI results.  Agreeable to discuss with palliative care.     Physical Exam   Vital Signs: Temp: 97.9  F (36.6  C) Temp src: Oral BP: (!) 151/70 Pulse: 89   Resp: 23 SpO2: 92 % O2 Device: Nasal cannula Oxygen Delivery: 2 LPM  Weight: 110 lbs 7.21 oz    Constitutional: Well developed, Well nourished, NAD,  HENT: Normocephalic, Atraumatic,  mucous membranes moist,   Neck- trachea midline, No stridor.    Eyes:EOMI, Conjunctiva normal, No discharge.   Respiratory: Normal breath  sounds, No respiratory distress, No wheezing.    Cardiovascular: Normal heart rate, Regular rhythm,  No murmurs,   Abdominal: Soft, No tenderness, No rebound or guarding.     Musculoskeletal: no deformity or malalignment.  Integument: Warm, Dry, No erythema, No rash.   Neurologic: Alert & oriented x 3   Psychiatric: Affect normal, Cooperative.     Medical Decision Making       50 MINUTES SPENT BY ME on the date of service doing chart review, history, exam, documentation & further activities per the note.      Data     I have personally reviewed the following data over the past 24 hrs:    N/A  \   N/A   / N/A     N/A N/A N/A /  N/A   N/A N/A N/A \       Imaging results reviewed over the past 24 hrs:   Recent Results (from the past 24 hour(s))   CT Abdomen Pelvis w/o Contrast    Narrative    EXAM: CT ABDOMEN PELVIS W/O CONTRAST  LOCATION: [facility]  DATE: 10/5/2023    INDICATION: lung mass, ?mets. elevated creatinine, unable to do contrast.  COMPARISON: 10/4/2023  TECHNIQUE: CT scan of the abdomen and pelvis was performed without IV contrast. Multiplanar reformats were obtained. Dose reduction techniques were used.  CONTRAST: None.    FINDINGS:   LOWER CHEST: Possibly partially visualized mass within the right lower lobe measuring 4.7 x 4.2 cm better evaluated on recent chest CT. Small to moderate right effusion with atelectasis. Patchy airspace opacity at the right lung base may be related to   pneumonia.    HEPATOBILIARY: Several low-attenuation lesions are noted throughout the liver highly suspicious for liver metastases. For example 1.2 cm segment 8 lesion (series 3, image 27). 2 cm lesion lesion inferior right hepatic lobe (series 3, image 75). 2.5 cm   lesion segment 5 of the liver (series 3, image 80). Segment 3 lesion measuring 1.3 cm (series 3, 2). Segment 7 lesion measuring 1.4 cm (series 3, image 32). Several additional low-attenuation lesions are noted throughout all segments of  the liver.   Gallbladder within normal limits.    PANCREAS: Normal.    SPLEEN: Normal.    ADRENAL GLANDS: Normal.    KIDNEYS/BLADDER: Small cyst at the superior pole of the right kidney which is benign and needs no further follow-up. Kidneys are otherwise unremarkable with no evidence of hydronephrosis.    BOWEL: Small left inguinal hernia containing loop of colon with no evidence of obstruction.    LYMPH NODES: Moderate atherosclerotic disease of the abdominal aorta and its branches with no aneurysmal dilation.    VASCULATURE: Unremarkable.    PELVIC ORGANS: Bladder within normal limits. Small fat-containing right inguinal hernia.    MUSCULOSKELETAL: Degenerative changes of the spine.  1.5 cm lytic area along the right L2 vertebral body. 1.7 x 0.9 cm lytic area within L3 vertebral body. Several lytic areas are noted within the pelvis for example right acetabulum measuring 1.2 cm.   Superior left iliac wing measuring 1.1 cm. Lytic areas noted within the sacrum measuring approximately 1.5 x 1.6 cm.      Impression    IMPRESSION:   1.  Partially visualized mass in the right lower lobe which is better evaluated on recent for possible malignancy. Patchy airspace opacity at the right lung base concerning for superimposed pneumonia.  2.  Small to moderate right effusion.  3.  Multiple low-attenuation lesions noted throughout the liver highly suspicious liver metastases.  4.  Small left inguinal hernia containing a loop of colon with no evidence of obstruction. Consider nonemergent surgical consultation.  5.  Several lytic areas are noted throughout the pelvis and vertebral bodies concerning for bony metastases.  [   MR Brain w/o Contrast    Narrative    EXAM: MR BRAIN W/O CONTRAST  LOCATION: [facility]  DATE: 10/5/2023    INDICATION:  Assessment intracranial metastases with lung mass, staging.  COMPARISON: None.  TECHNIQUE: Routine multiplanar multisequence head MRI without intravenous  contrast.    FINDINGS:  INTRACRANIAL CONTENTS: No acute or subacute infarct.  Subtle focus of subcortical diffusion restriction in the left single gyrus measuring 7 mm (series 3, image 19) without significant surrounding edema. No acute hemorrhage or extra-axial fluid   collections. Scattered nonspecific T2/FLAIR hyperintensities within the cerebral white matter most consistent with mild chronic microvascular ischemic change. Small chronic right cerebellar infarcts. Mild to moderate generalized cerebral atrophy. No   hydrocephalus. Normal position of the cerebellar tonsils.     SELLA: No abnormality accounting for technique.    OSSEOUS STRUCTURES/SOFT TISSUES: Normal marrow signal. The major intracranial vascular flow voids are maintained.     ORBITS: No abnormality accounting for technique.     SINUSES/MASTOIDS: No paranasal sinus mucosal disease. No middle ear or mastoid effusion.       Impression    IMPRESSION:  1.  7 mm subcortical lesion in the left cingulate gyrus suspicious for intracranial metastasis given the patient's history, however metastases typically demonstrate more profound surrounding edema. Contrast enhanced MRI is recommended to further   characterize.  2.  No mass effect or midline shift.

## 2023-10-06 NOTE — PLAN OF CARE
Problem: Plan of Care - These are the overarching goals to be used throughout the patient stay.    Goal: Patient-Specific Goal (Individualized): patient's family would like to know the next phase of care  Outcome: Progressing  Goal Outcome Evaluation: Writer spoke with Dr. Pollard([facility]) earlier this shift at the Nurse station to touch base on the next phase of care following Dx of lung mass and probable mets to bone and brain. Noted  placed order for Palliative which took place earlier today, and pending consult by Hematology and Oncology. Will continue to monitor.  -Leanne PETERSON RN

## 2023-10-06 NOTE — PLAN OF CARE
Problem: Gas Exchange Impaired  Goal: Optimal Gas Exchange  Outcome: Progressing     Problem: Plan of Care - These are the overarching goals to be used throughout the patient stay.    Goal: Optimal Comfort and Wellbeing  Outcome: Progressing   Goal Outcome Evaluation       Pt is alert but disoriented to time and situation. C/o sore throat may be d/t coughing. Denies pain. Coughs infrequently, congested. LS diminished. On O2 at 2LPM sating at 92-93%. HR on low 40's at times. IVF NS @ 75 ml/hr infusing well. Applied Mepilex to red and blanching sacrum. Attached to male external catheter, draining clear yellow urine.

## 2023-10-07 NOTE — PROGRESS NOTES
[facility]    Medicine Progress Note - Hospitalist Service    Date of Admission:  10/4/2023    Assessment & Plan   Akin Evans is a 77 year old male with history of developmental disability, essential hypertension, chronic systolic congestive heart failure, nonischemic cardiomyopathy, dyslipidemia, hearing loss, gout, CKD stage III, who presents after suffering a fall 1 week ago in his home and admitted on 10/4/2023 with back and left-sided rib pain.  Patient admitted for hypercaclemia, new right sided lung mass, and probable metastases to bone and brain.     CT imaging reveals right upper lobe bronchus mass likely primary lung cancer with multiple lytic bone lesions.  CT abdomen/pelvis revealed multiple hepatic lesions, and MRI brain with 7mm cingulate gyrus mass likely metastatic disease.  Appreciate pulmonology evaluation.  IR consulted for liver biopsy given CT findings of multiple liver lesions and recommending delaying biopsy until patient is off of ASA for 4 days.  Discussed with pulmonology on 10/6/23 and recommending palliative care consultation given patient would be poor candidate for chemotherapy or radiation.  Discussed with oncology and recommended palliative/comfort care.  Discussed with patient family and will order hospice consultation.       Right lung mass  Multiple lytic bone lesions  Liver lesions.  Brain lesion.  Likely primary lung cancer.   Pulmonology consultation appreciated.   Spoke with POONAM Love, and agreeable to plan for liver biopsy as ordered by Dr. Rangel.   Oncology consultation appreciated.   Patient and family to discuss with hospice consultant.   Family to discuss code status.     Hypercalcemia   Calcium improved to 11.6 with fursemide and IV fluids.   PTH suppressed at 9.   PTHrP in process.  Hold PTA vitamin D3  Hold normal saline 75 cc/h  Repeat base metabolic profile in a.m.    Essential hypertension  Heart failure with reduced ejection  fraction  Nonischemic cardiomyopathy  Bradycardia.   Pro-BNP elevated 3,394.   Echocardiogram LVEF 30-35% from 2022.   Holding Carvedilol 12.5 mg twice daily, will reduce to 3.125 mg po BID  hydralazine 10 mg twice daily,   Imdur 30 mg daily.  Increase Lasix 40 mg daily.  Supplemental oxygen as needed.     Acute respiratory failure with hypoxia.   Likely secondary to lung mass as above.   Continue home lasix.      Chronic kidney disease stage III  Baseline creatinine 2.5, admission 2.6.   Creatinine improved to 1.6 on 10/6.      Developmental delay  Brother is power of      Anemia  Hemoglobin 12.7->11.5 from dilution likely.     Gout  Continue allopurinol 300 mg daily.     Dyslipidemia  Atorvastatin 40 mg daily.        Diet: Combination Diet Regular Diet Adult    DVT Prophylaxis: Pneumatic Compression Devices  Melo Catheter: Not present  Lines: None     Cardiac Monitoring: None  Code Status: Full Code      Clinically Significant Risk Factors           # Hypercalcemia: Highest Ca = 11.6 mg/dL in last 2 days, will monitor as appropriate    # Hypoalbuminemia: Lowest albumin = 3.3 g/dL at 10/4/2023 12:24 PM, will monitor as appropriate   # Thrombocytopenia: Lowest platelets = 118 in last 2 days, will monitor for bleeding   # Hypertension: Noted on problem list                   Disposition Plan      Expected Discharge Date: 10/07/2023      Destination: home with family  Discharge Comments: Liver biopsy on 10/6/23.          Santos Pollard DO  Hospitalist Service  [facility]  Securely message with [employer] (more info)  Text page via [email or web address] Paging/Directory   ______________________________________________________________________    Interval History   Bill still has a cough, currently on supplemental oxygen at 2L/min via nasal cannula. Developed bradycardia overnight and Coreg was held.     Physical Exam   Vital Signs: Temp: 97.3  F (36.3  C) Temp src: Oral BP: 133/64 Pulse: (!) 41    Resp: 16 SpO2: 92 % O2 Device: Nasal cannula Oxygen Delivery: 2 LPM  Weight: 110 lbs 7.21 oz    Constitutional: Well developed, Well nourished, NAD,  HENT: Normocephalic, Atraumatic,  mucous membranes moist,   Neck- trachea midline, No stridor.    Eyes:EOMI, Conjunctiva normal, No discharge.   Respiratory: Normal breath sounds, No respiratory distress, No wheezing.    Cardiovascular: Normal heart rate, Regular rhythm,  No murmurs,   Abdominal: Soft, No tenderness, No rebound or guarding.     Musculoskeletal: no deformity or malalignment.  Integument: Warm, Dry, No erythema, No rash.   Neurologic: Alert oriented to person.  Psychiatric: Affect normal, Cooperative.     Medical Decision Making     50 MINUTES SPENT BY ME on the date of service doing chart review, history, exam, documentation & further activities per the note.      Data     I have personally reviewed the following data over the past 24 hrs:    10.6  \   12.1 (L)   / 131 (L)     144 109 (H) 52.8 (H) /  114 (H)   4.3 27 1.57 (H) \       Imaging results reviewed over the past 24 hrs:   No results found for this or any previous visit (from the past 24 hour(s)).

## 2023-10-07 NOTE — PLAN OF CARE
Problem: Risk for Delirium  Goal: Improved Behavioral Control  Intervention: Minimize Safety Risk  Recent Flowsheet Documentation  Taken 10/6/2023 1700 by Rahel Landa RN  Enhanced Safety Measures: room near unit station  Trust Relationship/Rapport: care explained    Problem: Gas Exchange Impaired  Goal: Optimal Gas Exchange  Intervention: Optimize Oxygenation and Ventilation  Recent Flowsheet Documentation  Taken 10/6/2023 1700 by Rahel Landa RN  Head of Bed (HOB) Positioning: HOB at 30-45 degrees     Pt A/O x 3 disoriented to situation forgetful at times. Allow patient time to respond to questions as [place] pocket talker utilized and helpful. Denies pain. Other than throat and chest pain when coughing. VSS on 2L NC. HR mid 40s this evening cross cover paged with carvedilol scheduled requesting parameters. (See cross cover note). Male purwick in place with good output. Tolerating po intake needing soft foods and tray set up. Pt able to make needs known. Alarms on for safety. Sister Lakia and brother Ivan at bedside part of shift very involved. Hemoc saw pt this evening and states will call Ivan pt POA.     Rahel Landa RN  [phone]

## 2023-10-07 NOTE — PLAN OF CARE
Problem: Plan of Care - These are the overarching goals to be used throughout the patient stay.    Goal: Optimal Comfort and Wellbeing  Outcome: Progressing     A/O to person and situation, disoriented to time. VSS on 2 L NC. L PIV SL. K+/Mag protocol;  no replacement needed, rechecks scheduled. Discharge pending hospice facility placement.

## 2023-10-07 NOTE — CONSULTS
[facility] Inpatient Consult Note    Patient: Akin Evans  MRN: [id number]  Date of Service: 10/7/2023      Reason for Visit    I was consulted by Santos Myers DO regarding suspected metastatic lung cancer    Assessment/Plan      ECOG Performance Status: 3-4    #.  A large right upper lobe lung mass, enlarged mediastinal and right hilar lymph nodes, right-sided pleural effusion, multiple liver masses, several lytic lesions throughout the pelvis and vertebral body concerning for metastatic lung cancer  #.  7 mm subcortical lesion in the left cingulate gyrus suspicious for metastatic brain mets  #.  Hypercalcemia likely hypercalcemia of malignancy + immobility  #. Hearing loss  #. CHF, chronic/nonischemic cardiomyopathy  #.  History of heavy smoking  #.  Developmental delay     His complex medical history was reviewed.  I also reviewed his labs.  He has persistent hypercalcemia with corrected calcium around 12 today.  PTH was suppressed.  PTHrP pending.  It is most likely that hypercalcemia is from PTHrP.   I reviewed the CT scans images and report independently.  I also reviewed the results with the patient and family member.  Current finding is clearly consistent with advanced stage metastatic lung cancer.  I discussed about 2 different type of lung cancers: Non-small cell lung cancer and small cell lung cancer, their differences in biology, prognosis and treatments.  It will be determined after biopsy and molecular study to determine the next step in treatment.  I discussed that median overall survival for untreated metastatic lung cancer is about 6 months and it will further influenced by comorbidities and performance status.  With treatment, the median survival improved to about 18 months in the right clinical setting.  I also discussed that cancer directed therapy is advisable in patient who has good performance status because cancer directed therapy will not  benefit in patient with poor performance status.  Boni is fairly frail.  The family reported that he was able to up and about moving good about a few weeks ago but quickly decline with significant weight loss, loss of appetite and limited mobility.  These are very concerning for aggressive cancer progression and also likely complicated by hypercalcemia of malignancy.  We carefully discussed about potential routes of treatment.  Treatment goal is palliative.  It can be achieved by symptom focused care or cancer directed therapy.  Cancer directed therapy could include targeted therapy, immunotherapy or chemotherapy.  Based on his smoking history, likelihood of the presence of targetable mutation if this is non-small cell lung cancer is fairly small.  He will not be a candidate for chemotherapy or immunotherapy with current performance status.  After thorough discussion about risk, benefits and alternative, family decided to cancel liver biopsy which will not provide any clinical impact for Boni.      -Continue supportive care.  -I recommend zoledronic acid 4 mg IV for hypercalcemia to help the symptoms including mental status.  -We all agreed that liver biopsy would not provide any meaningful impact in his treatment.  They also noted the option that if his former status is fantastically better in the next few weeks and wants to consider cancer directed therapy, we can obtain liver biopsy at that point.  -I advised to consider hospice.      Discussed with Dr. Pollard.    ______________________________________________________________________________      Staging History    Cancer Staging   No matching staging information was found for the patient.      History  His brother, sisters and family member were present during this encounter.    Mr. Akin Evans is a very pleasant 77 year old with a history of developmental delay presented to the emergency room after a fall and persistent back and left-sided rib pain.   "At the time of my encounter, he told me that he has pain in the chest.  He has a cough.  He does not have any other pain.  He is falling asleep during the encounter but easily arousable.  I reviewed the overnight event  Review of systems.  Apart from describing in history, the remainder of comprehensive ROS was negative.      Past History  No past medical history on file.  Past Surgical History:   Procedure Laterality Date    CATARACT EXTRACTION Bilateral     ESOPHAGOSCOPY, GASTROSCOPY, DUODENOSCOPY (EGD), COMBINED  2005     Family History   Problem Relation Age of Onset    Heart Disease Father         CAD    Hypertension Father     Cerebrovascular Disease Father     Valvular heart disease Mother      Social History     Socioeconomic History    Marital status: Single   Tobacco Use    Smoking status: Former     Packs/day: 1.00     Years: 40.00     Pack years: 40.00     Types: Cigarettes     Quit date: 1995     Years since quittin.7    Smokeless tobacco: Never   Substance and Sexual Activity    Alcohol use: No    Drug use: No       Allergies    No Known Allergies       Physical Exam    BP (!) 147/62 (BP Location: Left arm)   Pulse 76   Temp 97.5  F (36.4  C) (Oral)   Resp 20   Ht 1.549 m (5' 1\")   Wt 50.1 kg (110 lb 7.2 oz)   SpO2 94%   BMI 20.87 kg/m      General: alert, awake, not in acute distress  HEENT: Head: Normal, normocephalic, atraumatic.  Eye: Normal external eye, conjunctiva, lids cornea, MARY.  Nose: Normal external nose, mucus membranes and septum.  Pharynx: Normal buccal mucosa. Normal pharynx.  Neck / Thyroid: Supple, no masses, nodes, nodules or enlargement.  Lymphatics: No abnormally enlarged lymph nodes.  Chest: Normal chest wall and respirations. Clear to auscultation.  Heart: S1 S2 RRR, no murmur.   Abdomen: abdomen is soft without significant tenderness, masses, organomegaly or guarding  Extremities: normal strength, tone, and muscle mass  Skin: normal. no rash or " abnormalities  CNS: non focal.      Lab Results  Recent Results (from the past 24 hour(s))   Potassium    Collection Time: 10/07/23  9:09 AM   Result Value Ref Range    Potassium 3.7 3.4 - 5.3 mmol/L   Magnesium    Collection Time: 10/07/23  9:09 AM   Result Value Ref Range    Magnesium 2.2 1.7 - 2.3 mg/dL        Imaging Results    MR Brain w/o Contrast    Result Date: 10/5/2023  EXAM: MR BRAIN W/O CONTRAST LOCATION: [facility] DATE: 10/5/2023 INDICATION:  Assessment intracranial metastases with lung mass, staging. COMPARISON: None. TECHNIQUE: Routine multiplanar multisequence head MRI without intravenous contrast. FINDINGS: INTRACRANIAL CONTENTS: No acute or subacute infarct.  Subtle focus of subcortical diffusion restriction in the left single gyrus measuring 7 mm (series 3, image 19) without significant surrounding edema. No acute hemorrhage or extra-axial fluid collections. Scattered nonspecific T2/FLAIR hyperintensities within the cerebral white matter most consistent with mild chronic microvascular ischemic change. Small chronic right cerebellar infarcts. Mild to moderate generalized cerebral atrophy. No hydrocephalus. Normal position of the cerebellar tonsils. SELLA: No abnormality accounting for technique. OSSEOUS STRUCTURES/SOFT TISSUES: Normal marrow signal. The major intracranial vascular flow voids are maintained. ORBITS: No abnormality accounting for technique. SINUSES/MASTOIDS: No paranasal sinus mucosal disease. No middle ear or mastoid effusion.     IMPRESSION: 1.  7 mm subcortical lesion in the left cingulate gyrus suspicious for intracranial metastasis given the patient's history, however metastases typically demonstrate more profound surrounding edema. Contrast enhanced MRI is recommended to further characterize. 2.  No mass effect or midline shift.    CT Abdomen Pelvis w/o Contrast    Result Date: 10/5/2023  EXAM: CT ABDOMEN PELVIS W/O CONTRAST LOCATION: [facility]  [facility] DATE: 10/5/2023 INDICATION: lung mass, ?mets. elevated creatinine, unable to do contrast. COMPARISON: 10/4/2023 TECHNIQUE: CT scan of the abdomen and pelvis was performed without IV contrast. Multiplanar reformats were obtained. Dose reduction techniques were used. CONTRAST: None. FINDINGS: LOWER CHEST: Possibly partially visualized mass within the right lower lobe measuring 4.7 x 4.2 cm better evaluated on recent chest CT. Small to moderate right effusion with atelectasis. Patchy airspace opacity at the right lung base may be related to pneumonia. HEPATOBILIARY: Several low-attenuation lesions are noted throughout the liver highly suspicious for liver metastases. For example 1.2 cm segment 8 lesion (series 3, image 27). 2 cm lesion lesion inferior right hepatic lobe (series 3, image 75). 2.5 cm lesion segment 5 of the liver (series 3, image 80). Segment 3 lesion measuring 1.3 cm (series 3, 2). Segment 7 lesion measuring 1.4 cm (series 3, image 32). Several additional low-attenuation lesions are noted throughout all segments of the liver. Gallbladder within normal limits. PANCREAS: Normal. SPLEEN: Normal. ADRENAL GLANDS: Normal. KIDNEYS/BLADDER: Small cyst at the superior pole of the right kidney which is benign and needs no further follow-up. Kidneys are otherwise unremarkable with no evidence of hydronephrosis. BOWEL: Small left inguinal hernia containing loop of colon with no evidence of obstruction. LYMPH NODES: Moderate atherosclerotic disease of the abdominal aorta and its branches with no aneurysmal dilation. VASCULATURE: Unremarkable. PELVIC ORGANS: Bladder within normal limits. Small fat-containing right inguinal hernia. MUSCULOSKELETAL: Degenerative changes of the spine.  1.5 cm lytic area along the right L2 vertebral body. 1.7 x 0.9 cm lytic area within L3 vertebral body. Several lytic areas are noted within the pelvis for example right acetabulum measuring 1.2 cm. Superior  left iliac wing measuring 1.1 cm. Lytic areas noted within the sacrum measuring approximately 1.5 x 1.6 cm.     IMPRESSION: 1.  Partially visualized mass in the right lower lobe which is better evaluated on recent for possible malignancy. Patchy airspace opacity at the right lung base concerning for superimposed pneumonia. 2.  Small to moderate right effusion. 3.  Multiple low-attenuation lesions noted throughout the liver highly suspicious liver metastases. 4.  Small left inguinal hernia containing a loop of colon with no evidence of obstruction. Consider nonemergent surgical consultation. 5.  Several lytic areas are noted throughout the pelvis and vertebral bodies concerning for bony metastases. [    Chest CT w/o contrast    Result Date: 10/4/2023  EXAM: CT CHEST W/O CONTRAST LOCATION: [facility] DATE: 10/4/2023 INDICATION: left posterior rib and back pain after fall, eval for rib fx COMPARISON: None. TECHNIQUE: CT chest without IV contrast. Multiplanar reformats were obtained. Dose reduction techniques were used. CONTRAST: None. FINDINGS: LUNGS AND PLEURA: Abrupt occlusion of the right upper lobe bronchus with masslike consolidation throughout the right upper lobe. Small-moderate right pleural effusion also present, as well as patchy airspace opacities in the right lower lobe. Left lung and pleural space are clear. MEDIASTINUM/AXILLAE: 4.2 x 4.1 cm right hilar soft tissue mass or lymph node (4/85) and multiple enlarged subcarinal lymph nodes measuring up to 1.4 cm short axis. CORONARY ARTERY CALCIFICATION: Mild. UPPER ABDOMEN: Small hiatal hernia. MUSCULOSKELETAL: Lytic lesion in the right seventh rib laterally (4/104), expansile soft tissue mass in the left fourth rib anteriorly (4/67), destructive soft tissue mass within L1 left transverse process (4/135), and tiny lytic lesion in the right scapula (4/24). Nondisplaced fracture of the left eighth rib medially with associated lytic  lesion (4/58). Upper ribs are difficult to assess for nondisplaced fractures due to patient motion.     IMPRESSION: Abrupt occlusion of the right upper lobe bronchus with a large area of masslike consolidation in the right upper lobe concerning for primary lung carcinoma. There are also enlarged mediastinal and right hilar lymph nodes, as well as numerous lytic and destructive osseous lesions, which are likely metastatic. Recommend whole-body staging and tissue sampling. Dr. Evan Martinez was contacted by me on 10/4/2023 2:25 PM CDT and verbalized understanding of the result.     CT Thoracic Spine w/o Contrast    Result Date: 10/4/2023  EXAM: CT THORACIC SPINE W/O CONTRAST LOCATION: [facility] DATE: 10/4/2023 INDICATION: thoracic back pain after fall, eval for fx COMPARISON: None. TECHNIQUE: Routine CT Thoracic Spine without IV contrast. Multiplanar reformats. Dose reduction techniques were used. FINDINGS: VERTEBRA: Exaggerated thoracic kyphosis. Otherwise normal alignment. Vertebral body heights are maintained. Apparent ill-defined lucent lesion of the C7 vertebral body measuring 1.5 x 1.9 cm sagittal image 20 with question destruction of the posterior vertebral body margin and inferior endplate. Additional suspected osseous lucent lesions are noted at the anterior-inferior corner of T1, posterior margin of T6 to, right posterior superior corner of T3, posterior superior corner of T5, left lateral aspect of T8 vertebral body, and left anterior superior corner of T11 vertebral body. No fracture or posttraumatic subluxation. CANAL/FORAMINA: No canal or neural foraminal stenosis. Thoracic DISH is noted. PARASPINAL: Patient reported separately dictated CT chest 10/04/2023 for intrathoracic findings.     IMPRESSION: 1.  Negative for acute fracture. 2.  Multiple ill-defined lucent bony lesions. This is favored to represent osseous metastatic disease and further evaluation with contrast-enhanced MRI  of the cervical and thoracic spine is recommended. 3.  Please refer to separately dictated CT chest for intrathoracic findings.      120 minutes spent on the date of the encounter doing chart review, history and exam, documentation, communication of the treatment plan with the care team and further activities as noted above.    Signed by: Hector Lopes MD

## 2023-10-07 NOTE — PROGRESS NOTES
Care Management Follow Up     Length of Stay (days): 3     Expected Discharge Date: 10/08/2023     Concerns to be Addressed: patient with underlying developmental disabilities and new cancer diagnosis  Patient plan of care discussed at interdisciplinary rounds: Yes     Anticipated Discharge Disposition: Home with home care  Anticipated Discharge Services: None  Anticipated Discharge DME: None     Patient/family educated on Medicare website which has current facility and service quality ratings: no  Education Provided on the Discharge Plan: No  Patient/Family in Agreement with the Plan: unable to assess     Referrals Placed by CM/SW:  (Not currently indicated)  Private pay costs discussed: Not applicable     Additional Information:  0830 Chart reviewed. No new events overnight. Patient was seen by Palliative yesterday. Palliative notes that family states Ivan is patient's POA. We do not have documentation for this. Ivan recalls providing the documents to a facility in the [email or web address] system. Palliative was going to contact [facility] to investigate this. Family is requesting to speak with oncology to better understand patient diagnosis and prognosis. They anticipate being able to continue to care for patient at home. They would be accepting of home care if that is recommended - referral made and patient was accepted by [facility]. CM will continue to follow and assist with discharge plans and needs as warranted.     1430 Patient and family met with oncology today to discuss diagnosis, treatment options and prognosis. Decision was made for patient to be DNR/DNI. Home health referrals cancelled. Patient/family now desiring home hospice services through [facility]. Referral sent.               ERICKA León

## 2023-10-07 NOTE — PLAN OF CARE
Problem: Plan of Care - These are the overarching goals to be used throughout the patient stay.    Goal: Absence of Hospital-Acquired Illness or Injury  Intervention: Identify and Manage Fall Risk  Recent Flowsheet Documentation  Taken 10/7/2023 0721 by Leanne Hernandez RN  Safety Promotion/Fall Prevention:   safety round/check completed   room door open   room organization consistent   room near nurse's station   patient and family education   nonskid shoes/slippers when out of bed   mobility aid in reach   lighting adjusted   increase visualization of patient   increased rounding and observation   clutter free environment maintained   assistive device/personal items within reach   activity supervised     Problem: Plan of Care - These are the overarching goals to be used throughout the patient stay.    Goal: Optimal Comfort and Wellbeing  Intervention: Monitor Pain and Promote Comfort  Recent Flowsheet Documentation  Taken 10/7/2023 0721 by Leanne Hernandez RN  Pain Management Interventions: rest     Problem: Plan of Care - These are the overarching goals to be used throughout the patient stay.    Goal: Readiness for Transition of Care  Outcome: Progressing    Goal Outcome Evaluation: Alert and oriented to person, and situation as far as ADLs like able to understand and follow instruction with repositioning, eating breakfast, med pass, and speaks slowly but clear, spontaneous, and appropriate, denied pain, ate 100% breakfast, VSS afebrile, on 2LPM oxygen with O2 sat at 92%. Positioned HOB at 45 degrees to promote good lung and chest perfusion during breakfast and med pass oral intake tolerated well, on NS at 75ml/hr. Patient is tolerating the current plan of care.  Per SW notes, plans to discharge to home with home care. Lab at the bedside for the magnesium draw.  Will continue to monitor. -Leanne PETERSON RN

## 2023-10-07 NOTE — PROGRESS NOTES
We have received the referral and have updated rn station and brother that we will have leydi come talk to the family.

## 2023-10-07 NOTE — PLAN OF CARE
Problem: Risk for Delirium  Goal: Optimal Coping  Outcome: Progressing     Problem: Plan of Care - These are the overarching goals to be used throughout the patient stay.    Goal: Absence of Hospital-Acquired Illness or Injury  Intervention: Identify and Manage Fall Risk  Recent Flowsheet Documentation  Taken 10/6/2023 2012 by Reyes, Dora, RN  Safety Promotion/Fall Prevention:   safety round/check completed   room door open   patient and family education   nonskid shoes/slippers when out of bed   lighting adjusted   increase visualization of patient   Goal Outcome Evaluation:    Pt alert to self, intermittent forgetful. No acute changes this shift. VSS on 2L via nc, HR high 40- mid 50's.  Pt denies pain. Pt resting in bed, respirations even and unlabored. [place] w/pocket talker. No distress noted w/assessment, call light within reach.

## 2023-10-07 NOTE — PROGRESS NOTES
Writer informed by Patient's brother and sister Alexander and Lakia in person that they have spoken with the rest of their siblings and have all decided to go for DNR-DNI code status for the Patient.  Charge Nurse made aware, and to page Dr. Pollard([facility])  -Leanne PETERSON RN

## 2023-10-07 NOTE — PLAN OF CARE
Continues on 2L O2. Denies pain. Alarms on for safety. Utilizing pocket talker for hearing. IVNS running at 75mL/hr.    Problem: Plan of Care - These are the overarching goals to be used throughout the patient stay.    Goal: Readiness for Transition of Care  Outcome: Progressing     Problem: Gas Exchange Impaired  Goal: Optimal Gas Exchange  Outcome: Progressing   Goal Outcome Evaluation:

## 2023-10-08 NOTE — PROGRESS NOTES
[facility]    Medicine Progress Note - Hospitalist Service    Date of Admission:  10/4/2023    Addendum 10/8/2023 1230  Discussed with family and POA and wishes are to transition to comfort cares.   Will place comfort orders and monitor progression of Boni's condition in the hospital.     Santos Pollard DO, MS  [facility] Service  Internal Medicine    Assessment & Plan   Akin Evans is a 77 year old male with history of developmental disability, essential hypertension, chronic systolic congestive heart failure, nonischemic cardiomyopathy, dyslipidemia, hearing loss, gout, CKD stage III, who presents after suffering a fall 1 week ago in his home and admitted on 10/4/2023 with back and left-sided rib pain.  Patient admitted for hypercaclemia, new right sided lung mass, and probable metastases to bone and brain.     CT and MRI imaging reveals right upper lobe bronchus mass likely primary lung cancer, lytic bone lesions, liver lesions and small cingulate gyrus mass, all likely metastatic disease.  Pulmonology and oncology recommending hospice care.  Hospice consult placed on 10/7/23.  Overnight on 10/8, Boni developed hematemesis (coffee ground), currently hemoglobin is stable. Very poor prognosis, will discuss with POA and family about transitioning to comfort cares later today.       Large right upper lung mass.   Right pleural effusion.   Multiple lesions in other organ systems, likely metastatic lung cancer to bone, liver, brain.   Likely primary lung cancer.   Pulmonology and oncolology recommending hospice care.   Hospice consultation on 10/8/23.   Patient is DNR/DNI on 10/7/23.     Hematemesis  Coffee ground emesis on 10/8/23.   Hemoglobin stable at 12.1.   Continue pantoprazole 40 mg IV q12h.   Family not wanting procedures to further assess.      Hypercalcemia, likely hypercalcemia of malignancy   Calcium increased to 12.2.  Zometa given on 10/7/23.   PTH suppressed  at 9.   PTHrP in process.  Hold PTA vitamin D3  Repeat base metabolic profile in a.m.    Essential hypertension  Heart failure with reduced ejection fraction  Nonischemic cardiomyopathy  Bradycardia.   Pro-BNP elevated 3,394->12,000.   Echocardiogram LVEF 30-35% from 2022.   Holding Carvedilol 12.5 mg twice daily, will reduce to 3.125 mg po BID  hydralazine 10 mg twice daily,   Imdur 30 mg daily.  Continue Lasix 40 mg daily.  Supplemental oxygen as needed.     Hypernatremia  Order D5W 75 ml/hr.   Repeat sodium this afternoon.     Acute respiratory failure with hypoxia.   Likely secondary to lung mass as above.   Continue IV lasix as above.   Continue supplemental oxygen.     Chronic kidney disease stage III  Baseline creatinine 2.5, admission 2.6.   Creatinine improved to 1.6 on 10/6.      Developmental delay  Brother is power of      Anemia  Hemoglobin 12.7->11.5 from dilution likely.     Gout  Continue allopurinol 300 mg daily.     Dyslipidemia  Atorvastatin 40 mg daily.      Diet: NPO for Medical/Clinical Reasons Except for: Meds, Ice Chips    DVT Prophylaxis: Pneumatic Compression Devices  Melo Catheter: Not present  Lines: None     Cardiac Monitoring: None  Code Status: No CPR- Do NOT Intubate      Clinically Significant Risk Factors         # Hypernatremia: Highest Na = 148 mmol/L in last 2 days, will monitor as appropriate   # Hypercalcemia: Highest Ca = 12.2 mg/dL in last 2 days, will monitor as appropriate    # Hypoalbuminemia: Lowest albumin = 2.9 g/dL at 10/8/2023  1:55 AM, will monitor as appropriate     # Hypertension: Noted on problem list                   Disposition Plan      Expected Discharge Date: 10/09/2023      Destination: home with family  Discharge Comments: hospice consult/set up at home          Santos Pollard DO  Hospitalist Service  [facility]  Securely message with "[employer]" (more info)  Text page via [employer] Paging/Directory    ______________________________________________________________________    Interval History   Boni had episode of hematochezia and melena overnight.  He is currently on 4L/min via nasal cannula.  He is responsive.  Sister Lakia is present and his brother Ivan (POONAM) will come today.     Physical Exam   Vital Signs: Temp: 97.6  F (36.4  C) Temp src: Axillary BP: 114/54 Pulse: 52   Resp: (!) 32 SpO2: 92 % O2 Device: Nasal cannula Oxygen Delivery: 4 LPM  Weight: 108 lbs 10.99 oz    Constitutional: Well developed, Well nourished, NAD,  HENT: Normocephalic, Atraumatic,  mucous membranes moist,   Neck- trachea midline, No stridor.    Eyes:EOMI, Conjunctiva normal, No discharge.   Respiratory: Normal breath sounds, No respiratory distress, No wheezing.    Cardiovascular: Normal heart rate, Regular rhythm,  No murmurs,   Abdominal: Soft, No tenderness, No rebound or guarding.     Musculoskeletal: no deformity or malalignment.  Integument: Warm, Dry, No erythema, No rash.   Neurologic: Alert oriented to person.  Psychiatric: Affect normal, Cooperative.     Medical Decision Making       40 MINUTES SPENT BY ME on the date of service doing chart review, history, exam, documentation & further activities per the note.      Data     I have personally reviewed the following data over the past 24 hrs:    13.5 (H)  \   12.1 (L)   / 135 (L)     148 (H) 107 49.6 (H) /  162 (H)   3.9 29 1.55 (H) \     ALT: 54 AST: 103 (H) AP: 441 (H) TBILI: 0.5   ALB: 2.9 (L) TOT PROTEIN: 6.3 (L) LIPASE: N/A     Trop: N/A BNP: 12,785 (H)     INR:  1.08 PTT:  N/A   D-dimer:  N/A Fibrinogen:  N/A       Imaging results reviewed over the past 24 hrs:   Recent Results (from the past 24 hour(s))   XR Chest Port 1 View    Narrative    EXAM: XR CHEST PORT 1 VIEW  LOCATION: [facility]  DATE: 10/8/2023    INDICATION: SOB, vomitting, R o aspiration  COMPARISON: Chest CT from 10/04/2023.      Impression    IMPRESSION: Progressive  opacification of the right hemithorax with large pleural effusion. Only a small amount of the right lung base remains aerated. The left lung is grossly clear. No pneumothorax. Visualized cardiac contours within normal limits   though visualization is obscured along the right borders.

## 2023-10-08 NOTE — PROGRESS NOTES
Care Management Follow Up    Length of Stay (days): 4    Expected Discharge Date: 10/09/2023     Concerns to be Addressed: care progression, discharge planning  Patient plan of care discussed at interdisciplinary rounds: No    Anticipated Discharge Disposition: Home, pending clinical progression  Disposition Comments: [facility] following  Anticipated Discharge Services: Possible Hospice services  Anticipated Discharge DME: pending clinical progression    Patient/family educated on Medicare website which has current facility and service quality ratings: no  Education Provided on the Discharge Plan: No  Patient/Family in Agreement with the Plan:     Referrals Placed by CM/SW:  (Not currently indicated)  Private pay costs discussed:  not at this time    Additional Information:  Chart reviewed.  Pt transitioning to comfort cares today.  Palliative team has been following.    Call placed to [facility].  Per on-call staff, Lisha from [facility] met with pt and family yesterday (10/7) to discuss potential Hospice services. Lisha plans to follow up with Care Management on Monday morning, 10/9/2023.    Liya Conway RN

## 2023-10-08 NOTE — PLAN OF CARE
Problem: Plan of Care - These are the overarching goals to be used throughout the patient stay.    Goal: Patient-Specific Goal (Individualized): Care Transitioned to Comfort Cares  Flowsheets (Taken 10/8/2023 1415)  Individualized Care Needs: Comfort cares  Goal Outcome Evaluation: Patient's pain and comfort managed with PRN SL dilaudid, open both eyes with voice and gentle touch, breathing continues to become shallow despite of 4LPM O2 therapy, family declined VS assessment, 40mg IV lasix was given earlier to treat excess secretions in the lungs and chest to help with breathing, HOB set at 45, turned and repositioned as tolerated, hygiene cares, gown and linen changed provided, primofit external catheter in placed. Family at the bedside. Patient noted comfortable, . Will continue to monitor. -Leanne PETERSON RN

## 2023-10-08 NOTE — PROGRESS NOTES
Informed of large coffee ground vomitus, and some dark stools    Looks like in 11/2005 - had EGD and also H pylori treatment  Very sedated when I met him. He was just given compazine 5 mgs IV    A - Consider UGIB, BP OK    Plans  1. IV protonix  2. CBC/CMP/Mag, type/screen, VBG   3. NPO  4. Consider GI evaluation   5. Aspiration precs  6. Chest XR - aspiration     341A - More awake. Labs/Imaging noted. Na rising to 148, might need to hold, if getting dehydrated.     630A - HR 63, O2 sat 93-94%. Awake and following commands.Upper airway gurgliness/Still SOB.

## 2023-10-08 NOTE — PLAN OF CARE
Goal Outcome Evaluation:    VSS on 4L nc. Pt is alert and oriented to self and situation. Calls appropriately for needs. Assist of 2.  Pt tolerating regular diet. IV saline locked.  Voids with male external catheter. Denies pain. Alarms on. K and Mg protocol, recheck in AM.

## 2023-10-08 NOTE — PROGRESS NOTES
Writer is with Dr. Pollard in Patient's room with family at the bedside to discuss the next phase of care.  At this time, Patient's family(Patient's 4 siblings) have all agreed to go for COMFORT CARES. -Leanne PETERSON RN

## 2023-10-08 NOTE — PROGRESS NOTES
Brief Pulm Note  Clinical picture concerning for metastatic lung cancer.  I do not think the patient will benefit from any aggressive airway measures.  Recommend palliative care consult and it looks like liver biopsy will be the least invasive way to diagnose and stage him.    If there are questions regarding prognosis and treatment options that can be discussed with the oncology team.    Pulmonary will sign off.  Please contact me with any questions.    Santos June MD

## 2023-10-08 NOTE — PROGRESS NOTES
Writer paged Dr. Pollard if okay to hold oral meds for now to prevent aspiration. Charge Nurse made aware.  Pending response. -Leanne PETERSON RN

## 2023-10-08 NOTE — PLAN OF CARE
"Boni utilized call light around 0100 after having spontaneous emesis. IV compazine given. Did not feel nauseas prior to or after emesis. Emesis was coffee ground, with dark red noted on chucks pad. Was incontinent of black stool at that time as well. MD notified, came to bedside, CXR and labs completed. NPO and aspiration precautions initiated.    Bill later c/o \"not feeling well. All over.\" Congestion and lung sounds increasingly coarse. Cough weak, ineffective, not able to produce sputum. Younker suction at bedside. 4L O2 via nasal cannula.     IV zofran given prior to SL dilaudid for SOB/tachypnea, per Bill's request for \"something to help with breathing.\" C/o nausea shortly after receiving dilaudid, relief with repositioning. Purewick utilized. Alarms on for safety.     Unable to obtain gastric sample for occult blood. Hg remains stable at this time. Plan for hospice to visit later today.       Problem: Plan of Care - These are the overarching goals to be used throughout the patient stay.    Goal: Optimal Comfort and Wellbeing  Outcome: Not Progressing     Problem: Gas Exchange Impaired  Goal: Optimal Gas Exchange  Outcome: Not Progressing     Problem: Risk for Delirium  Goal: Improved Sleep  Outcome: Not Progressing   Goal Outcome Evaluation:                        "

## 2023-10-09 NOTE — PLAN OF CARE
Problem: Plan of Care - These are the overarching goals to be used throughout the patient stay.    Goal: Plan of Care Review  Outcome: Progressing     Problem: End-of-Life Care  Goal: Comfort, Peace and Preserved Dignity  Outcome: Progressing   Goal Outcome Evaluation:    Optimized patient's comfort on shift.  Repositioned about every 2 hours per patient's comfort level.  Gave PRN morphine and dilaudid for pain and PRN atropine for secretions.  Patient arousing to voice/gentle touch, however not speaking.  Was able to nod yes/no to some questions.  On 4L O2 for comfort.  Male purewick in place.

## 2023-10-09 NOTE — PROGRESS NOTES
Pronounced at 1AM, October 9, 2023    Unresponsive to stimuli, pale, no HR, no RR, fixed dilated pupils      Assessments  - Acute respiratory failure with hypoxia.   - Congestive heart failure  - Clinical picture concerning for metastatic lung cancer. to bone, liver, brain.   - Hematemesis  - Essential hypertension  - Chronic kidney disease stage III    Plans  - will inform AM doc  - Post mortem care

## 2023-10-09 NOTE — PROGRESS NOTES
Upon rounding on patient, he was found .  MD notified.  He assessed patient and pronounced the patient at 0100.

## 2023-10-09 NOTE — DISCHARGE SUMMARY
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   Hematemesis  Hypercalcemia of malignancy  Essential hypertension  Acute heart failure with reduced ejection fraction.   Nonischemic cardiomyopathy  Bradycardia  Hypernatremia  Acute respiratory failure with hypoxia  Chronic kidney disease stage III  History of Gout  Normocytic anemia      Cause of death: Acute respiratory failure with hypoxia secondary to acute on chronic heart failure with reduced ejection fraction, presumed metastatic lung cancer with metastases to bone, liver, brain, and possibly gastrointestinal tract, and hematemesis.     Hospital Course   Akin Evans is a 77 year old male with history of developmental disability, essential hypertension, chronic systolic congestive heart failure, nonischemic cardiomyopathy, dyslipidemia, hearing loss, gout, CKD stage III, who presents after suffering a fall 1 week ago in his home and admitted on 10/4/2023 with back and left-sided rib pain.  Patient admitted for hypercaclemia, new right sided lung mass, and probable metastases to bone, liver and brain.      CT and MRI imaging reveals right upper lobe bronchus mass likely primary lung cancer, lytic bone lesions, liver lesions and small cingulate gyrus mass, all likely metastatic disease.  Pulmonology and oncology recommended against further biopsy or option for chemotherapy/radiation therapy and advised hospice care. Family was reviewing options for hospice care.  Overnight on 10/8, Boni developed coffee ground emesis and a drop in his hemoglobin level.  Due to his poor prognosis and after a family conference, Boni was transitioned to comfort care.  He passed away on the morning of 10/9/23.  Rest in peace Akin  Cristina.       Santos Pollard DO  [facility]  ______________________________________________________________________      Significant Results and Procedures   Most Recent 3 CBC's:  Recent Labs   Lab Test 10/08/23  0827 10/08/23  0155 10/06/23  0831   WBC 15.2* 13.5* 10.6   HGB 11.4* 12.1* 12.1*   * 108* 107*   * 135* 131*     Most Recent 3 BMP's:  Recent Labs   Lab Test 10/08/23  0750 10/08/23  0155 10/07/23  0909 10/06/23  0831   * 148*  --  144   POTASSIUM 5.2 3.9 3.7 4.3   CHLORIDE 110* 107  --  109*   CO2 24 29  --  27   BUN 59.1* 49.6*  --  52.8*   CR 1.52* 1.55*  --  1.57*   ANIONGAP 12 12  --  8   CRISTOFER 11.7* 12.2*  --  11.6*   * 162*  --  114*     Most Recent 2 LFT's:  Recent Labs   Lab Test 10/08/23  0155 10/04/23  1224   * 78*   ALT 54 46   ALKPHOS 441* 420*   BILITOTAL 0.5 0.4     Most Recent 3 Troponin's:No lab results found.  Most Recent 3 BNP's:  Recent Labs   Lab Test 10/08/23  0155 10/06/23  0831   NTBNPI  --  3,394*   NTBNP 12,785*  --      Most Recent Urinalysis:No lab results found.  Most Recent ESR & CRP:No lab results found.  Most Recent Anemia Panel:  Recent Labs   Lab Test 10/08/23  0827   WBC 15.2*   HGB 11.4*   HCT 36.0*   *   *   ,   Results for orders placed or performed during the hospital encounter of 10/04/23   CT Thoracic Spine w/o Contrast    Narrative    EXAM: CT THORACIC SPINE W/O CONTRAST  LOCATION: [facility]  DATE: 10/4/2023    INDICATION: thoracic back pain after fall, eval for fx  COMPARISON: None.  TECHNIQUE: Routine CT Thoracic Spine without IV contrast. Multiplanar reformats. Dose reduction techniques were used.     FINDINGS:  VERTEBRA: Exaggerated thoracic kyphosis. Otherwise normal alignment. Vertebral body heights are maintained. Apparent ill-defined lucent lesion of the C7 vertebral body measuring 1.5 x 1.9 cm sagittal image 20 with question destruction of the posterior    vertebral body margin and inferior endplate. Additional suspected osseous lucent lesions are noted at the anterior-inferior corner of T1, posterior margin of T6 to, right posterior superior corner of T3, posterior superior corner of T5, left lateral   aspect of T8 vertebral body, and left anterior superior corner of T11 vertebral body. No fracture or posttraumatic subluxation.     CANAL/FORAMINA: No canal or neural foraminal stenosis. Thoracic DISH is noted.    PARASPINAL: Patient reported separately dictated CT chest 10/04/2023 for intrathoracic findings.      Impression    IMPRESSION:  1.  Negative for acute fracture.  2.  Multiple ill-defined lucent bony lesions. This is favored to represent osseous metastatic disease and further evaluation with contrast-enhanced MRI of the cervical and thoracic spine is recommended.  3.  Please refer to separately dictated CT chest for intrathoracic findings.     Chest CT w/o contrast    Narrative    EXAM: CT CHEST W/O CONTRAST  LOCATION: [facility]  DATE: 10/4/2023    INDICATION: left posterior rib and back pain after fall, eval for rib fx  COMPARISON: None.  TECHNIQUE: CT chest without IV contrast. Multiplanar reformats were obtained. Dose reduction techniques were used.  CONTRAST: None.    FINDINGS:   LUNGS AND PLEURA: Abrupt occlusion of the right upper lobe bronchus with masslike consolidation throughout the right upper lobe. Small-moderate right pleural effusion also present, as well as patchy airspace opacities in the right lower lobe. Left lung   and pleural space are clear.    MEDIASTINUM/AXILLAE: 4.2 x 4.1 cm right hilar soft tissue mass or lymph node (4/85) and multiple enlarged subcarinal lymph nodes measuring up to 1.4 cm short axis.    CORONARY ARTERY CALCIFICATION: Mild.    UPPER ABDOMEN: Small hiatal hernia.     MUSCULOSKELETAL: Lytic lesion in the right seventh rib laterally (4/104), expansile soft tissue mass in the left fourth rib  anteriorly (4/67), destructive soft tissue mass within L1 left transverse process (4/135), and tiny lytic lesion in the right   scapula (4/24). Nondisplaced fracture of the left eighth rib medially with associated lytic lesion (4/58). Upper ribs are difficult to assess for nondisplaced fractures due to patient motion.      Impression    IMPRESSION:   Abrupt occlusion of the right upper lobe bronchus with a large area of masslike consolidation in the right upper lobe concerning for primary lung carcinoma. There are also enlarged mediastinal and right hilar lymph nodes, as well as numerous lytic and   destructive osseous lesions, which are likely metastatic. Recommend whole-body staging and tissue sampling.    Dr. Evan Martinez was contacted by me on 10/4/2023 2:25 PM CDT and verbalized understanding of the result.     CT Abdomen Pelvis w/o Contrast    Narrative    EXAM: CT ABDOMEN PELVIS W/O CONTRAST  LOCATION: [facility]  DATE: 10/5/2023    INDICATION: lung mass, ?mets. elevated creatinine, unable to do contrast.  COMPARISON: 10/4/2023  TECHNIQUE: CT scan of the abdomen and pelvis was performed without IV contrast. Multiplanar reformats were obtained. Dose reduction techniques were used.  CONTRAST: None.    FINDINGS:   LOWER CHEST: Possibly partially visualized mass within the right lower lobe measuring 4.7 x 4.2 cm better evaluated on recent chest CT. Small to moderate right effusion with atelectasis. Patchy airspace opacity at the right lung base may be related to   pneumonia.    HEPATOBILIARY: Several low-attenuation lesions are noted throughout the liver highly suspicious for liver metastases. For example 1.2 cm segment 8 lesion (series 3, image 27). 2 cm lesion lesion inferior right hepatic lobe (series 3, image 75). 2.5 cm   lesion segment 5 of the liver (series 3, image 80). Segment 3 lesion measuring 1.3 cm (series 3, 2). Segment 7 lesion measuring 1.4 cm (series 3, image 32). Several  additional low-attenuation lesions are noted throughout all segments of the liver.   Gallbladder within normal limits.    PANCREAS: Normal.    SPLEEN: Normal.    ADRENAL GLANDS: Normal.    KIDNEYS/BLADDER: Small cyst at the superior pole of the right kidney which is benign and needs no further follow-up. Kidneys are otherwise unremarkable with no evidence of hydronephrosis.    BOWEL: Small left inguinal hernia containing loop of colon with no evidence of obstruction.    LYMPH NODES: Moderate atherosclerotic disease of the abdominal aorta and its branches with no aneurysmal dilation.    VASCULATURE: Unremarkable.    PELVIC ORGANS: Bladder within normal limits. Small fat-containing right inguinal hernia.    MUSCULOSKELETAL: Degenerative changes of the spine.  1.5 cm lytic area along the right L2 vertebral body. 1.7 x 0.9 cm lytic area within L3 vertebral body. Several lytic areas are noted within the pelvis for example right acetabulum measuring 1.2 cm.   Superior left iliac wing measuring 1.1 cm. Lytic areas noted within the sacrum measuring approximately 1.5 x 1.6 cm.      Impression    IMPRESSION:   1.  Partially visualized mass in the right lower lobe which is better evaluated on recent for possible malignancy. Patchy airspace opacity at the right lung base concerning for superimposed pneumonia.  2.  Small to moderate right effusion.  3.  Multiple low-attenuation lesions noted throughout the liver highly suspicious liver metastases.  4.  Small left inguinal hernia containing a loop of colon with no evidence of obstruction. Consider nonemergent surgical consultation.  5.  Several lytic areas are noted throughout the pelvis and vertebral bodies concerning for bony metastases.  [   MR Brain w/o Contrast    Narrative    EXAM: MR BRAIN W/O CONTRAST  LOCATION: [facility]  DATE: 10/5/2023    INDICATION:  Assessment intracranial metastases with lung mass, staging.  COMPARISON: None.  TECHNIQUE:  Routine multiplanar multisequence head MRI without intravenous contrast.    FINDINGS:  INTRACRANIAL CONTENTS: No acute or subacute infarct.  Subtle focus of subcortical diffusion restriction in the left single gyrus measuring 7 mm (series 3, image 19) without significant surrounding edema. No acute hemorrhage or extra-axial fluid   collections. Scattered nonspecific T2/FLAIR hyperintensities within the cerebral white matter most consistent with mild chronic microvascular ischemic change. Small chronic right cerebellar infarcts. Mild to moderate generalized cerebral atrophy. No   hydrocephalus. Normal position of the cerebellar tonsils.     SELLA: No abnormality accounting for technique.    OSSEOUS STRUCTURES/SOFT TISSUES: Normal marrow signal. The major intracranial vascular flow voids are maintained.     ORBITS: No abnormality accounting for technique.     SINUSES/MASTOIDS: No paranasal sinus mucosal disease. No middle ear or mastoid effusion.       Impression    IMPRESSION:  1.  7 mm subcortical lesion in the left cingulate gyrus suspicious for intracranial metastasis given the patient's history, however metastases typically demonstrate more profound surrounding edema. Contrast enhanced MRI is recommended to further   characterize.  2.  No mass effect or midline shift.   XR Chest Port 1 View    Narrative    EXAM: XR CHEST PORT 1 VIEW  LOCATION: [facility]  DATE: 10/8/2023    INDICATION: SOB, vomitting, R o aspiration  COMPARISON: Chest CT from 10/04/2023.      Impression    IMPRESSION: Progressive opacification of the right hemithorax with large pleural effusion. Only a small amount of the right lung base remains aerated. The left lung is grossly clear. No pneumothorax. Visualized cardiac contours within normal limits   though visualization is obscured along the right borders.       Consultations This Hospital Stay   PULMONARY IP CONSULT  CARE MANAGEMENT / SOCIAL WORK IP CONSULT  PHYSICAL  THERAPY ADULT IP CONSULT  OCCUPATIONAL THERAPY ADULT IP CONSULT  INTERVENTIONAL RADIOLOGY ADULT/PEDS IP CONSULT  PALLIATIVE CARE ADULT IP CONSULT  HEMATOLOGY & ONCOLOGY IP CONSULT  INPATIENT HOSPICE ADULT CONSULT  SPIRITUAL HEALTH SERVICES IP CONSULT    Primary Care Physician   Santos Garrett    Time Spent on this Encounter   I, Santos Pollard DO, personally saw the patient today and spent greater than 30 minutes discharging this patient.

## 2023-10-11 LAB — PTH RELATED PROT SERPL-SCNC: 8.2 PMOL/L
